# Patient Record
Sex: FEMALE | Race: WHITE | NOT HISPANIC OR LATINO
[De-identification: names, ages, dates, MRNs, and addresses within clinical notes are randomized per-mention and may not be internally consistent; named-entity substitution may affect disease eponyms.]

---

## 2017-10-30 ENCOUNTER — RX RENEWAL (OUTPATIENT)
Age: 27
End: 2017-10-30

## 2017-11-09 ENCOUNTER — APPOINTMENT (OUTPATIENT)
Dept: OBGYN | Facility: CLINIC | Age: 27
End: 2017-11-09
Payer: COMMERCIAL

## 2017-11-09 VITALS — DIASTOLIC BLOOD PRESSURE: 78 MMHG | WEIGHT: 137 LBS | SYSTOLIC BLOOD PRESSURE: 123 MMHG

## 2017-11-09 DIAGNOSIS — Z01.419 ENCOUNTER FOR GYNECOLOGICAL EXAMINATION (GENERAL) (ROUTINE) W/OUT ABNORMAL FINDINGS: ICD-10-CM

## 2017-11-09 PROCEDURE — 99395 PREV VISIT EST AGE 18-39: CPT

## 2017-11-13 LAB — CYTOLOGY CVX/VAG DOC THIN PREP: NORMAL

## 2017-11-21 ENCOUNTER — RX RENEWAL (OUTPATIENT)
Age: 27
End: 2017-11-21

## 2018-10-18 ENCOUNTER — MEDICATION RENEWAL (OUTPATIENT)
Age: 28
End: 2018-10-18

## 2018-11-12 ENCOUNTER — APPOINTMENT (OUTPATIENT)
Dept: OBGYN | Facility: CLINIC | Age: 28
End: 2018-11-12
Payer: COMMERCIAL

## 2018-11-12 VITALS
WEIGHT: 150 LBS | BODY MASS INDEX: 23.54 KG/M2 | DIASTOLIC BLOOD PRESSURE: 78 MMHG | SYSTOLIC BLOOD PRESSURE: 122 MMHG | HEIGHT: 67 IN

## 2018-11-12 PROCEDURE — 99395 PREV VISIT EST AGE 18-39: CPT

## 2018-11-19 LAB — CYTOLOGY CVX/VAG DOC THIN PREP: NORMAL

## 2019-08-22 ENCOUNTER — MEDICATION RENEWAL (OUTPATIENT)
Age: 29
End: 2019-08-22

## 2019-11-08 ENCOUNTER — RX RENEWAL (OUTPATIENT)
Age: 29
End: 2019-11-08

## 2019-11-25 ENCOUNTER — APPOINTMENT (OUTPATIENT)
Dept: OBGYN | Facility: CLINIC | Age: 29
End: 2019-11-25

## 2019-12-11 ENCOUNTER — APPOINTMENT (OUTPATIENT)
Dept: OBGYN | Facility: CLINIC | Age: 29
End: 2019-12-11
Payer: COMMERCIAL

## 2019-12-11 VITALS
SYSTOLIC BLOOD PRESSURE: 118 MMHG | WEIGHT: 140 LBS | DIASTOLIC BLOOD PRESSURE: 80 MMHG | HEIGHT: 67 IN | BODY MASS INDEX: 21.97 KG/M2

## 2019-12-11 DIAGNOSIS — Z01.419 ENCOUNTER FOR GYNECOLOGICAL EXAMINATION (GENERAL) (ROUTINE) W/OUT ABNORMAL FINDINGS: ICD-10-CM

## 2019-12-11 PROCEDURE — 99395 PREV VISIT EST AGE 18-39: CPT

## 2019-12-11 NOTE — PHYSICAL EXAM
[Alert] : alert [Awake] : awake [Mass] : no breast mass [Acute Distress] : no acute distress [Nipple Discharge] : no nipple discharge [Soft] : soft [Tender] : non tender [Axillary LAD] : no axillary lymphadenopathy [Oriented x3] : oriented to person, place, and time [Uterine Adnexae] : were not tender and not enlarged [Normal] : uterus [No Bleeding] : there was no active vaginal bleeding

## 2019-12-11 NOTE — HISTORY OF PRESENT ILLNESS
[1 Year Ago] : 1 year ago [Good] : being in good health [Healthy Diet] : a healthy diet [Regular Exercise] : regular exercise [Weight Concerns] : no concerns with her weight [Pap Smear Last Year] : Papanicolaou cytology last year [Menstrual Problems] : reports normal menses [Up to Date] : up to date with ~his/her~ immunizations [Sexually Active] : is sexually active

## 2019-12-16 LAB — CYTOLOGY CVX/VAG DOC THIN PREP: NORMAL

## 2021-02-11 ENCOUNTER — APPOINTMENT (OUTPATIENT)
Dept: OBGYN | Facility: CLINIC | Age: 31
End: 2021-02-11
Payer: COMMERCIAL

## 2021-02-11 VITALS
SYSTOLIC BLOOD PRESSURE: 117 MMHG | WEIGHT: 145 LBS | BODY MASS INDEX: 22.76 KG/M2 | DIASTOLIC BLOOD PRESSURE: 77 MMHG | HEIGHT: 67 IN

## 2021-02-11 PROCEDURE — 99395 PREV VISIT EST AGE 18-39: CPT

## 2021-02-11 PROCEDURE — 99072 ADDL SUPL MATRL&STAF TM PHE: CPT

## 2021-02-11 NOTE — HISTORY OF PRESENT ILLNESS
[FreeTextEntry1] : pt is a 29 y/o p0 lmp 2/8 presents for annual gyn visit  [No] : Patient does not have concerns regarding sex

## 2021-02-18 LAB — CYTOLOGY CVX/VAG DOC THIN PREP: NORMAL

## 2021-07-29 ENCOUNTER — TRANSCRIPTION ENCOUNTER (OUTPATIENT)
Age: 31
End: 2021-07-29

## 2021-07-29 ENCOUNTER — APPOINTMENT (OUTPATIENT)
Dept: OBGYN | Facility: CLINIC | Age: 31
End: 2021-07-29
Payer: COMMERCIAL

## 2021-07-29 ENCOUNTER — ASOB RESULT (OUTPATIENT)
Age: 31
End: 2021-07-29

## 2021-07-29 ENCOUNTER — NON-APPOINTMENT (OUTPATIENT)
Age: 31
End: 2021-07-29

## 2021-07-29 VITALS
SYSTOLIC BLOOD PRESSURE: 108 MMHG | HEIGHT: 67 IN | WEIGHT: 131 LBS | BODY MASS INDEX: 20.56 KG/M2 | DIASTOLIC BLOOD PRESSURE: 74 MMHG

## 2021-07-29 PROCEDURE — 76817 TRANSVAGINAL US OBSTETRIC: CPT

## 2021-07-29 PROCEDURE — 36415 COLL VENOUS BLD VENIPUNCTURE: CPT

## 2021-07-29 PROCEDURE — 99212 OFFICE O/P EST SF 10 MIN: CPT

## 2021-08-06 LAB
ABO + RH PNL BLD: NORMAL
ALBUMIN SERPL ELPH-MCNC: 4.8 G/DL
ALP BLD-CCNC: 50 U/L
ALT SERPL-CCNC: 12 U/L
ANION GAP SERPL CALC-SCNC: 15 MMOL/L
AR GENE MUT ANL BLD/T: NORMAL
AST SERPL-CCNC: 18 U/L
B19V IGG SER QL IA: 7.23 INDEX
B19V IGG+IGM SER-IMP: NORMAL
B19V IGG+IGM SER-IMP: POSITIVE
B19V IGM FLD-ACNC: 0.12 INDEX
B19V IGM SER-ACNC: NEGATIVE
BASOPHILS # BLD AUTO: 0.05 K/UL
BASOPHILS NFR BLD AUTO: 0.8 %
BILIRUB SERPL-MCNC: 0.3 MG/DL
BLD GP AB SCN SERPL QL: NORMAL
BUN SERPL-MCNC: 10 MG/DL
C TRACH RRNA SPEC QL NAA+PROBE: NOT DETECTED
CALCIUM SERPL-MCNC: 9.7 MG/DL
CHLORIDE SERPL-SCNC: 104 MMOL/L
CO2 SERPL-SCNC: 22 MMOL/L
CREAT SERPL-MCNC: 0.66 MG/DL
EOSINOPHIL # BLD AUTO: 0.08 K/UL
EOSINOPHIL NFR BLD AUTO: 1.3 %
FMR1 GENE MUT ANL BLD/T: NORMAL
GLUCOSE SERPL-MCNC: 94 MG/DL
HBV SURFACE AG SER QL: NONREACTIVE
HCT VFR BLD CALC: 40.6 %
HCV AB SER QL: NONREACTIVE
HCV S/CO RATIO: 0.08 S/CO
HGB A MFR BLD: 97.6 %
HGB A2 MFR BLD: 2.4 %
HGB BLD-MCNC: 13.5 G/DL
HGB FRACT BLD-IMP: NORMAL
HIV1+2 AB SPEC QL IA.RAPID: NONREACTIVE
IMM GRANULOCYTES NFR BLD AUTO: 0.2 %
LEAD BLD-MCNC: <1 UG/DL
LYMPHOCYTES # BLD AUTO: 1.76 K/UL
LYMPHOCYTES NFR BLD AUTO: 29.4 %
MAN DIFF?: NORMAL
MCHC RBC-ENTMCNC: 29.9 PG
MCHC RBC-ENTMCNC: 33.3 GM/DL
MCV RBC AUTO: 89.8 FL
MEV IGG FLD QL IA: >300 AU/ML
MEV IGG+IGM SER-IMP: POSITIVE
MEV IGM SER QL: <0.91 ISR
MONOCYTES # BLD AUTO: 0.44 K/UL
MONOCYTES NFR BLD AUTO: 7.3 %
N GONORRHOEA RRNA SPEC QL NAA+PROBE: NOT DETECTED
NEUTROPHILS # BLD AUTO: 3.65 K/UL
NEUTROPHILS NFR BLD AUTO: 61 %
PLATELET # BLD AUTO: 253 K/UL
POTASSIUM SERPL-SCNC: 3.8 MMOL/L
PROT SERPL-MCNC: 6.7 G/DL
RBC # BLD: 4.52 M/UL
RBC # FLD: 12.8 %
RUBV IGG FLD-ACNC: 23.3 INDEX
RUBV IGG SER-IMP: POSITIVE
SODIUM SERPL-SCNC: 141 MMOL/L
SOURCE AMPLIFICATION: NORMAL
T PALLIDUM AB SER QL IA: NEGATIVE
TSH SERPL-ACNC: 2.16 UIU/ML
VZV AB TITR SER: POSITIVE
VZV IGG SER IF-ACNC: 2225 INDEX
WBC # FLD AUTO: 5.99 K/UL

## 2021-08-10 ENCOUNTER — ASOB RESULT (OUTPATIENT)
Age: 31
End: 2021-08-10

## 2021-08-10 ENCOUNTER — NON-APPOINTMENT (OUTPATIENT)
Age: 31
End: 2021-08-10

## 2021-08-10 ENCOUNTER — APPOINTMENT (OUTPATIENT)
Dept: OBGYN | Facility: CLINIC | Age: 31
End: 2021-08-10
Payer: COMMERCIAL

## 2021-08-10 PROCEDURE — 76817 TRANSVAGINAL US OBSTETRIC: CPT

## 2021-08-12 ENCOUNTER — NON-APPOINTMENT (OUTPATIENT)
Age: 31
End: 2021-08-12

## 2021-08-15 ENCOUNTER — NON-APPOINTMENT (OUTPATIENT)
Age: 31
End: 2021-08-15

## 2021-08-15 LAB — CFTR MUT TESTED BLD/T: POSITIVE

## 2021-08-16 ENCOUNTER — NON-APPOINTMENT (OUTPATIENT)
Age: 31
End: 2021-08-16

## 2021-08-18 ENCOUNTER — ASOB RESULT (OUTPATIENT)
Age: 31
End: 2021-08-18

## 2021-08-18 ENCOUNTER — APPOINTMENT (OUTPATIENT)
Dept: OBGYN | Facility: CLINIC | Age: 31
End: 2021-08-18
Payer: COMMERCIAL

## 2021-08-18 PROCEDURE — 76830 TRANSVAGINAL US NON-OB: CPT

## 2021-08-20 ENCOUNTER — NON-APPOINTMENT (OUTPATIENT)
Age: 31
End: 2021-08-20

## 2021-08-26 ENCOUNTER — NON-APPOINTMENT (OUTPATIENT)
Age: 31
End: 2021-08-26

## 2021-08-26 ENCOUNTER — TRANSCRIPTION ENCOUNTER (OUTPATIENT)
Age: 31
End: 2021-08-26

## 2021-08-26 ENCOUNTER — APPOINTMENT (OUTPATIENT)
Dept: OBGYN | Facility: CLINIC | Age: 31
End: 2021-08-26
Payer: COMMERCIAL

## 2021-08-26 ENCOUNTER — ASOB RESULT (OUTPATIENT)
Age: 31
End: 2021-08-26

## 2021-08-26 PROCEDURE — 76830 TRANSVAGINAL US NON-OB: CPT

## 2021-08-27 ENCOUNTER — NON-APPOINTMENT (OUTPATIENT)
Age: 31
End: 2021-08-27

## 2021-09-01 ENCOUNTER — NON-APPOINTMENT (OUTPATIENT)
Age: 31
End: 2021-09-01

## 2021-09-02 ENCOUNTER — APPOINTMENT (OUTPATIENT)
Dept: PEDIATRIC MEDICAL GENETICS | Facility: CLINIC | Age: 31
End: 2021-09-02

## 2021-09-09 ENCOUNTER — APPOINTMENT (OUTPATIENT)
Dept: OBGYN | Facility: CLINIC | Age: 31
End: 2021-09-09

## 2021-09-09 ENCOUNTER — ASOB RESULT (OUTPATIENT)
Age: 31
End: 2021-09-09

## 2021-09-09 ENCOUNTER — APPOINTMENT (OUTPATIENT)
Dept: OBGYN | Facility: CLINIC | Age: 31
End: 2021-09-09
Payer: COMMERCIAL

## 2021-09-09 ENCOUNTER — APPOINTMENT (OUTPATIENT)
Dept: ANTEPARTUM | Facility: CLINIC | Age: 31
End: 2021-09-09

## 2021-09-09 PROCEDURE — 76830 TRANSVAGINAL US NON-OB: CPT

## 2021-09-10 ENCOUNTER — NON-APPOINTMENT (OUTPATIENT)
Age: 31
End: 2021-09-10

## 2021-09-22 ENCOUNTER — APPOINTMENT (OUTPATIENT)
Dept: MATERNAL FETAL MEDICINE | Facility: CLINIC | Age: 31
End: 2021-09-22

## 2021-10-01 ENCOUNTER — NON-APPOINTMENT (OUTPATIENT)
Age: 31
End: 2021-10-01

## 2021-11-11 ENCOUNTER — FORM ENCOUNTER (OUTPATIENT)
Age: 31
End: 2021-11-11

## 2021-12-03 ENCOUNTER — APPOINTMENT (OUTPATIENT)
Dept: OBGYN | Facility: CLINIC | Age: 31
End: 2021-12-03

## 2021-12-08 ENCOUNTER — APPOINTMENT (OUTPATIENT)
Dept: OBGYN | Facility: CLINIC | Age: 31
End: 2021-12-08

## 2022-01-03 ENCOUNTER — APPOINTMENT (OUTPATIENT)
Dept: ANTEPARTUM | Facility: CLINIC | Age: 32
End: 2022-01-03
Payer: COMMERCIAL

## 2022-01-03 ENCOUNTER — ASOB RESULT (OUTPATIENT)
Age: 32
End: 2022-01-03

## 2022-01-03 PROCEDURE — 76813 OB US NUCHAL MEAS 1 GEST: CPT

## 2022-01-03 PROCEDURE — 36416 COLLJ CAPILLARY BLOOD SPEC: CPT

## 2022-01-03 PROCEDURE — 76801 OB US < 14 WKS SINGLE FETUS: CPT

## 2022-02-25 ENCOUNTER — ASOB RESULT (OUTPATIENT)
Age: 32
End: 2022-02-25

## 2022-02-25 ENCOUNTER — APPOINTMENT (OUTPATIENT)
Dept: ANTEPARTUM | Facility: CLINIC | Age: 32
End: 2022-02-25
Payer: COMMERCIAL

## 2022-02-25 PROCEDURE — 76805 OB US >/= 14 WKS SNGL FETUS: CPT

## 2022-03-07 ENCOUNTER — APPOINTMENT (OUTPATIENT)
Dept: ANTEPARTUM | Facility: CLINIC | Age: 32
End: 2022-03-07
Payer: COMMERCIAL

## 2022-03-07 ENCOUNTER — ASOB RESULT (OUTPATIENT)
Age: 32
End: 2022-03-07

## 2022-03-07 PROCEDURE — 76816 OB US FOLLOW-UP PER FETUS: CPT

## 2022-05-30 ENCOUNTER — INPATIENT (INPATIENT)
Facility: HOSPITAL | Age: 32
LOS: 0 days | Discharge: HOME | End: 2022-05-31
Attending: STUDENT IN AN ORGANIZED HEALTH CARE EDUCATION/TRAINING PROGRAM | Admitting: STUDENT IN AN ORGANIZED HEALTH CARE EDUCATION/TRAINING PROGRAM

## 2022-05-30 VITALS — HEART RATE: 69 BPM | DIASTOLIC BLOOD PRESSURE: 86 MMHG | TEMPERATURE: 98 F | SYSTOLIC BLOOD PRESSURE: 119 MMHG

## 2022-05-30 DIAGNOSIS — Z98.890 OTHER SPECIFIED POSTPROCEDURAL STATES: Chronic | ICD-10-CM

## 2022-05-30 DIAGNOSIS — Z90.89 ACQUIRED ABSENCE OF OTHER ORGANS: Chronic | ICD-10-CM

## 2022-05-30 DIAGNOSIS — O26.899 OTHER SPECIFIED PREGNANCY RELATED CONDITIONS, UNSPECIFIED TRIMESTER: ICD-10-CM

## 2022-05-30 DIAGNOSIS — Z3A.00 WEEKS OF GESTATION OF PREGNANCY NOT SPECIFIED: ICD-10-CM

## 2022-05-30 LAB
ALBUMIN SERPL ELPH-MCNC: 3.5 G/DL — SIGNIFICANT CHANGE UP (ref 3.3–5)
ALP SERPL-CCNC: 96 U/L — SIGNIFICANT CHANGE UP (ref 40–120)
ALT FLD-CCNC: 10 U/L — SIGNIFICANT CHANGE UP (ref 4–33)
ANION GAP SERPL CALC-SCNC: 11 MMOL/L — SIGNIFICANT CHANGE UP (ref 7–14)
APPEARANCE UR: CLEAR — SIGNIFICANT CHANGE UP
APTT BLD: 27 SEC — SIGNIFICANT CHANGE UP (ref 27–36.3)
AST SERPL-CCNC: 19 U/L — SIGNIFICANT CHANGE UP (ref 4–32)
BASOPHILS # BLD AUTO: 0.05 K/UL — SIGNIFICANT CHANGE UP (ref 0–0.2)
BASOPHILS NFR BLD AUTO: 0.5 % — SIGNIFICANT CHANGE UP (ref 0–2)
BILIRUB SERPL-MCNC: 0.3 MG/DL — SIGNIFICANT CHANGE UP (ref 0.2–1.2)
BILIRUB UR-MCNC: NEGATIVE — SIGNIFICANT CHANGE UP
BLD GP AB SCN SERPL QL: NEGATIVE — SIGNIFICANT CHANGE UP
BUN SERPL-MCNC: 4 MG/DL — LOW (ref 7–23)
CALCIUM SERPL-MCNC: 9 MG/DL — SIGNIFICANT CHANGE UP (ref 8.4–10.5)
CHLORIDE SERPL-SCNC: 107 MMOL/L — SIGNIFICANT CHANGE UP (ref 98–107)
CO2 SERPL-SCNC: 19 MMOL/L — LOW (ref 22–31)
COLOR SPEC: COLORLESS — SIGNIFICANT CHANGE UP
COVID-19 SPIKE DOMAIN AB INTERP: POSITIVE
COVID-19 SPIKE DOMAIN ANTIBODY RESULT: >250 U/ML — HIGH
CREAT ?TM UR-MCNC: 17 MG/DL — SIGNIFICANT CHANGE UP
CREAT SERPL-MCNC: 0.49 MG/DL — LOW (ref 0.5–1.3)
DIFF PNL FLD: NEGATIVE — SIGNIFICANT CHANGE UP
EGFR: 131 ML/MIN/1.73M2 — SIGNIFICANT CHANGE UP
EOSINOPHIL # BLD AUTO: 0.03 K/UL — SIGNIFICANT CHANGE UP (ref 0–0.5)
EOSINOPHIL NFR BLD AUTO: 0.3 % — SIGNIFICANT CHANGE UP (ref 0–6)
FIBRINOGEN PPP-MCNC: 674 MG/DL — HIGH (ref 330–520)
GLUCOSE SERPL-MCNC: 74 MG/DL — SIGNIFICANT CHANGE UP (ref 70–99)
GLUCOSE UR QL: NEGATIVE — SIGNIFICANT CHANGE UP
HCT VFR BLD CALC: 34.6 % — SIGNIFICANT CHANGE UP (ref 34.5–45)
HGB BLD-MCNC: 11.2 G/DL — LOW (ref 11.5–15.5)
IANC: 6.99 K/UL — SIGNIFICANT CHANGE UP (ref 1.8–7.4)
IMM GRANULOCYTES NFR BLD AUTO: 0.5 % — SIGNIFICANT CHANGE UP (ref 0–1.5)
KETONES UR-MCNC: NEGATIVE — SIGNIFICANT CHANGE UP
LEUKOCYTE ESTERASE UR-ACNC: NEGATIVE — SIGNIFICANT CHANGE UP
LYMPHOCYTES # BLD AUTO: 1.67 K/UL — SIGNIFICANT CHANGE UP (ref 1–3.3)
LYMPHOCYTES # BLD AUTO: 17.8 % — SIGNIFICANT CHANGE UP (ref 13–44)
MCHC RBC-ENTMCNC: 26.9 PG — LOW (ref 27–34)
MCHC RBC-ENTMCNC: 32.4 GM/DL — SIGNIFICANT CHANGE UP (ref 32–36)
MCV RBC AUTO: 83.2 FL — SIGNIFICANT CHANGE UP (ref 80–100)
MONOCYTES # BLD AUTO: 0.59 K/UL — SIGNIFICANT CHANGE UP (ref 0–0.9)
MONOCYTES NFR BLD AUTO: 6.3 % — SIGNIFICANT CHANGE UP (ref 2–14)
NEUTROPHILS # BLD AUTO: 6.99 K/UL — SIGNIFICANT CHANGE UP (ref 1.8–7.4)
NEUTROPHILS NFR BLD AUTO: 74.6 % — SIGNIFICANT CHANGE UP (ref 43–77)
NITRITE UR-MCNC: NEGATIVE — SIGNIFICANT CHANGE UP
NRBC # BLD: 0 /100 WBCS — SIGNIFICANT CHANGE UP
NRBC # FLD: 0 K/UL — SIGNIFICANT CHANGE UP
PH UR: 8 — SIGNIFICANT CHANGE UP (ref 5–8)
PLATELET # BLD AUTO: 157 K/UL — SIGNIFICANT CHANGE UP (ref 150–400)
POTASSIUM SERPL-MCNC: 4 MMOL/L — SIGNIFICANT CHANGE UP (ref 3.5–5.3)
POTASSIUM SERPL-SCNC: 4 MMOL/L — SIGNIFICANT CHANGE UP (ref 3.5–5.3)
PROT ?TM UR-MCNC: 4 MG/DL — SIGNIFICANT CHANGE UP
PROT ?TM UR-MCNC: 4 MG/DL — SIGNIFICANT CHANGE UP
PROT SERPL-MCNC: 5.7 G/DL — LOW (ref 6–8.3)
PROT UR-MCNC: NEGATIVE — SIGNIFICANT CHANGE UP
PROT/CREAT UR-RTO: 0.2 RATIO — SIGNIFICANT CHANGE UP (ref 0–0.2)
RBC # BLD: 4.16 M/UL — SIGNIFICANT CHANGE UP (ref 3.8–5.2)
RBC # FLD: 13.1 % — SIGNIFICANT CHANGE UP (ref 10.3–14.5)
RH IG SCN BLD-IMP: POSITIVE — SIGNIFICANT CHANGE UP
RH IG SCN BLD-IMP: POSITIVE — SIGNIFICANT CHANGE UP
SARS-COV-2 IGG+IGM SERPL QL IA: >250 U/ML — HIGH
SARS-COV-2 IGG+IGM SERPL QL IA: POSITIVE
SARS-COV-2 RNA SPEC QL NAA+PROBE: SIGNIFICANT CHANGE UP
SODIUM SERPL-SCNC: 137 MMOL/L — SIGNIFICANT CHANGE UP (ref 135–145)
SP GR SPEC: 1.01 — SIGNIFICANT CHANGE UP (ref 1–1.05)
T PALLIDUM AB TITR SER: NEGATIVE — SIGNIFICANT CHANGE UP
URATE SERPL-MCNC: 3.9 MG/DL — SIGNIFICANT CHANGE UP (ref 2.5–7)
UROBILINOGEN FLD QL: SIGNIFICANT CHANGE UP
WBC # BLD: 9.38 K/UL — SIGNIFICANT CHANGE UP (ref 3.8–10.5)
WBC # FLD AUTO: 9.38 K/UL — SIGNIFICANT CHANGE UP (ref 3.8–10.5)

## 2022-05-30 RX ORDER — SODIUM CHLORIDE 9 MG/ML
1000 INJECTION, SOLUTION INTRAVENOUS
Refills: 0 | Status: DISCONTINUED | OUTPATIENT
Start: 2022-05-30 | End: 2022-05-31

## 2022-05-30 RX ORDER — SODIUM CHLORIDE 9 MG/ML
1000 INJECTION, SOLUTION INTRAVENOUS ONCE
Refills: 0 | Status: COMPLETED | OUTPATIENT
Start: 2022-05-30 | End: 2022-05-30

## 2022-05-30 RX ADMIN — SODIUM CHLORIDE 2000 MILLILITER(S): 9 INJECTION, SOLUTION INTRAVENOUS at 12:40

## 2022-05-30 RX ADMIN — SODIUM CHLORIDE 125 MILLILITER(S): 9 INJECTION, SOLUTION INTRAVENOUS at 13:55

## 2022-05-30 RX ADMIN — Medication 12 MILLIGRAM(S): at 15:48

## 2022-05-30 NOTE — OB PROVIDER H&P - NSHPPHYSICALEXAM_GEN_ALL_CORE
pt seen and examined     in [facility]   ICU Vital Signs Last 24 Hrs  T(C): 36.6 (30 May 2022 11:27), Max: 36.6 (30 May 2022 11:16)  T(F): 97.9 (30 May 2022 11:27), Max: 97.9 (30 May 2022 11:27)  HR: 71 (30 May 2022 12:02) (68 - 71)  BP: 117/88 (30 May 2022 12:02) (109/74 - 119/86)  RR: 17 (30 May 2022 11:27) (17 - 17)  pt in NAD   lungs clear   heart s1 s2  abd soft gravid palpable contrxs   placed on EFM      scan vertex cristin 9 bpp 8/8    sees FM during scan pt seen and examined     in [facility]   ICU Vital Signs Last 24 Hrs  T(C): 36.6 (30 May 2022 11:27), Max: 36.6 (30 May 2022 11:16)  T(F): 97.9 (30 May 2022 11:27), Max: 97.9 (30 May 2022 11:27)  HR: 71 (30 May 2022 12:02) (68 - 71)  BP: 117/88 (30 May 2022 12:02) (109/74 - 119/86)  RR: 17 (30 May 2022 11:27) (17 - 17)  pt in NAD   lungs clear   heart s1 s2  abd soft gravid palpable contrxs   placed on EFM      scan vertex cristin 9 bpp 8/8    sees FM during scan  12p VE l/c/p/-3 vtx   2p VE l/c/p/-3  vtx  exam unchanged

## 2022-05-30 NOTE — OB RN TRIAGE NOTE - FALL HARM RISK - UNIVERSAL INTERVENTIONS
Bed in lowest position, wheels locked, appropriate side rails in place/Call bell, personal items and telephone in reach/Instruct patient to call for assistance before getting out of bed or chair/Non-slip footwear when patient is out of bed/[place] to call system/Physically safe environment - no spills, clutter or unnecessary equipment/Purposeful Proactive Rounding/Room/bathroom lighting operational, light cord in reach

## 2022-05-30 NOTE — OB PROVIDER TRIAGE NOTE - HISTORY OF PRESENT ILLNESS
31 year old female  at 33.1 weeks who stated that she has been feeling decreased FM since Saturday and stated felt no FM this am    despite drinking eating and moving around and stated fetus usually active  stated that last scan breech presentation    denied any ap issues denied any fetal issues    stated feels irregular contractions at times  denied any LOF VB

## 2022-05-30 NOTE — OB RN PATIENT PROFILE - FALL HARM RISK - UNIVERSAL INTERVENTIONS
Bed in lowest position, wheels locked, appropriate side rails in place/Call bell, personal items and telephone in reach/Instruct patient to call for assistance before getting out of bed or chair/Non-slip footwear when patient is out of bed/[place] to call system/Physically safe environment - no spills, clutter or unnecessary equipment/Purposeful Proactive Rounding/Room/bathroom lighting operational, light cord in reach

## 2022-05-30 NOTE — OB PROVIDER H&P - HISTORY OF PRESENT ILLNESS
31 year old female  at 33.1 weeks who stated that she has been feeling decreased FM since Saturday and stated felt no FM this am    despite drinking eating and moving around and stated fetus usually active  stated that last scan breech presentation    denied any ap issues denied any fetal issues    stated feels irregular contractions at times  denied any LOF VB       31 year old female  at 33.1 weeks who stated that she has been feeling decreased FM since Saturday and stated felt no FM this am    despite drinking eating and moving around and stated fetus usually active  stated that last scan breech presentation    denied any ap issues denied any fetal issues    stated feels irregular contractions at times  denied any LOF VB    stated that she had a office visit with labile BP and trace protein and was following BP at home stated normal BP range   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

## 2022-05-30 NOTE — CHART NOTE - NSCHARTNOTEFT_GEN_A_CORE
R3 OB Note    Patient evaluated at bedside. Patient reports she presented to L&D for decreased fetal movement x 1 day. Reports feeling "raul greco" for a few days. Reports feeling nonpainful tightening every few minutes.    VS  T(C): 36.8 (22 @ 15:41)  HR: 83 (22 @ 19:14)  BP: 130/87 (22 @ 19:14)  RR: 17 (22 @ 15:41)  SpO2: --    Gen: NAD  Resp: unlabored on RA  CV: RR  GI: soft, nontender, gravid    FHT: 135, mod, + accels. - decels  [place]: q3-4 min  SVE: closed/long/-2      32yo  at 33 1/7 initially presenting for decreased fetal movement, found to have regular contractions without cervical change. No signs or symptoms of infection or PTL at this time. Fetal status reassuring    # contractions  - BMZ for FLM (-)  - pt s/p prolonged monitoring; switch to NST BID  - no signs or symptoms of infection/PTL  - UA wnl      #MWB  - Reg Diet, SLIV  - SCDs for DVT PPx  - PNV    #FWB  - BMZ/monitoring as above  - NICU consult PRN  - MICHELLE youssef in AM    TAMMY Myers, PGY-3  d/w Dr. Laird

## 2022-05-30 NOTE — OB PROVIDER H&P - ASSESSMENT
31 year old female  at 33.1 weeks initially presented with decreased FM  noted with  contractions    kept for observation    Betamethasone for FLM    NICU consult  prn    ATU scan in AM     case d/w Dr Youssef  in to see and speak with patient and plan of care d/w patient     Huber SMITH   31 year old female  at 33.1 weeks initially presented with decreased FM  noted with  contractions    kept for observation    Betamethasone for FLM    NICU consult  prn    ATU scan in AM    baseline Hellp labs  ( hx of isolated labile BP at office visit     case d/w Dr Youssef  in to see and speak with patient and plan of care d/w patient     Huber SMITH   31 year old female  at 33.1 weeks initially presented with decreased FM  noted with  contractions  no cervical change   kept for observation and monitoring.   Betamethasone for FLM    NICU consult  prn    ATU scan in AM    baseline Hellp labs  ( hx of isolated labile BP at office visit     case d/w Dr Youssef  in to see and speak with patient and plan of care d/w patient     Huber SMITH

## 2022-05-31 ENCOUNTER — APPOINTMENT (OUTPATIENT)
Dept: ANTEPARTUM | Facility: HOSPITAL | Age: 32
End: 2022-05-31
Payer: COMMERCIAL

## 2022-05-31 ENCOUNTER — TRANSCRIPTION ENCOUNTER (OUTPATIENT)
Age: 32
End: 2022-05-31

## 2022-05-31 ENCOUNTER — ASOB RESULT (OUTPATIENT)
Age: 32
End: 2022-05-31

## 2022-05-31 VITALS
HEART RATE: 80 BPM | DIASTOLIC BLOOD PRESSURE: 82 MMHG | TEMPERATURE: 98 F | RESPIRATION RATE: 17 BRPM | SYSTOLIC BLOOD PRESSURE: 135 MMHG | OXYGEN SATURATION: 98 %

## 2022-05-31 LAB
CULTURE RESULTS: SIGNIFICANT CHANGE UP
CULTURE RESULTS: SIGNIFICANT CHANGE UP
SPECIMEN SOURCE: SIGNIFICANT CHANGE UP
SPECIMEN SOURCE: SIGNIFICANT CHANGE UP

## 2022-05-31 PROCEDURE — 76816 OB US FOLLOW-UP PER FETUS: CPT | Mod: 26

## 2022-05-31 PROCEDURE — 76819 FETAL BIOPHYS PROFIL W/O NST: CPT | Mod: 26

## 2022-05-31 RX ORDER — CHOLECALCIFEROL (VITAMIN D3) 125 MCG
0 CAPSULE ORAL
Qty: 0 | Refills: 0 | DISCHARGE

## 2022-05-31 RX ADMIN — Medication 12 MILLIGRAM(S): at 15:54

## 2022-05-31 NOTE — DISCHARGE NOTE ANTEPARTUM - CARE PROVIDER_API CALL
Ronny Kinsey)  Obstetrics and Gynecology  [address] [place] [address]  Phone: [phone]  Fax: [phone]  Follow Up Time:

## 2022-05-31 NOTE — PROGRESS NOTE ADULT - ATTENDING COMMENTS
Patient seen and examined at bedside. Agree with above note. For likely dc today if exam unchanged after ATU sonogram and 2nd dose of betamethasone.

## 2022-05-31 NOTE — DISCHARGE NOTE ANTEPARTUM - MEDICATION SUMMARY - MEDICATIONS TO TAKE
I will START or STAY ON the medications listed below when I get home from the hospital:    PNV Prenatal oral tablet  -- 1 tab(s) by mouth once a day  -- Indication: For maternal health

## 2022-05-31 NOTE — DISCHARGE NOTE ANTEPARTUM - CARE PLAN
1 Principal Discharge DX:	Decreased fetal movement, antepartum  Assessment and plan of treatment:	- call OBGYN to schedule follow up, please plan to be seen within one week or sooner based on your OBGYN recommendations  - please return to hospital for further evaluation for decreased fetal movement, no fetal movement, leaking of fluid, vaginal bleeding, cramping and/or contractions  -please do not hesitate to contact for questions and/or concerns

## 2022-05-31 NOTE — CHART NOTE - NSCHARTNOTEFT_GEN_A_CORE
Patient completed prolong continuos monitoring.  reviewed fetal heart tracing. Patient cleared for intermittent monitoring at this time. Patient to resume fetal heart tracing at 1730.

## 2022-05-31 NOTE — PROGRESS NOTE ADULT - ASSESSMENT
30yo  at 33+2 initially presenting for decreased fetal movement, found to have regular contractions without cervical change. No signs or symptoms of infection or PTL at this time. Fetal status reassuring    # contractions  - BMZ for FLM (-)  - NST BID  - no signs or symptoms of infection/PTL  - UA wnl    #MWB  - Reg Diet, SLIV  - SCDs for DVT PPx  - PNV    #FWB  - BMZ/monitoring as above  - NICU consult PRN  - ATU domonique in AM    Eval for discharge today  Tu PGY3

## 2022-05-31 NOTE — DISCHARGE NOTE ANTEPARTUM - PATIENT PORTAL LINK FT
You can access the FollowMyHealth Patient Portal offered by [facility] by registering at the following website: [email or web address]. By joining [employer]’s FollowMyHealth portal, you will also be able to view your health information using other applications (apps) compatible with our system.

## 2022-05-31 NOTE — DISCHARGE NOTE ANTEPARTUM - HOSPITAL COURSE
Patient is a 21 y/o EGA 33 2/  admitted for decreased fetal movement x 1 day. In addition, patient reports feeling "raul greco" for a few days. Reports feeling nonpainful tightening every few minutes.    Assessment/Plan during hospital stay:    FHT: 135, mod, + accels. - decels  [place]: q3-4 min  SVE: closed/long/-     contractions  - BMZ for FLM (-)  - pt s/p prolonged monitoring; switch to non stress test monitoring  - no signs or symptoms of infection/PTL  - UA wnl  - ATU sono 2022 results:    MFM discharge recommendations: Patient is a 23 y/o EGA 33 2/  admitted for decreased fetal movement x 1 day. In addition, patient reports feeling "raul greco" for a few days. Reports feeling nonpainful tightening every few minutes.    Assessment/Plan during hospital stay:    FHT: 135, mod, + accels. - decels  [place]: q3-4 min  SVE: closed/long/-  Exam prior to discharge: 0/40/-3     contractions  - BMZ for FLM (-)  - pt s/p prolonged monitoring; switch to non stress test monitoring  - no signs or symptoms of infection/PTL  - UA wnl  - ATU sono 2022 results: cephalic presentation, anterior placenta, no previa, AFUA 12.35, EFW 1548,  BPP    MFM discharge recommendations: Patient to follow up with OBGYN. Patient is scheduled to see OBGYN tomorrow evening. Patient verbalized plan of care.

## 2022-05-31 NOTE — PROGRESS NOTE ADULT - SUBJECTIVE AND OBJECTIVE BOX
R3 Antepartum Note, HD#2    Gestational AGE: 33+2    Interval events: Patient seen and examined at bedside, no acute overnight events. No acute complaints. Pt reports +FM, denies LOF, VB, ctx, HA, epigastric pain, blurred vision, CP, SOB, N/V, fevers, and chills.    Vital Signs Last 24 Hours  T(C): 36.7 (05-31-22 @ 05:40), Max: 36.8 (05-30-22 @ 15:07)  HR: 72 (05-31-22 @ 05:40) (64 - 83)  BP: 108/61 (05-31-22 @ 05:40) (99/54 - 153/88)  RR: 18 (05-31-22 @ 05:40) (17 - 20)  SpO2: 98% (05-31-22 @ 05:40) (97% - 99%)        Physical Exam:  General: NAD  Abdomen: Soft, non-tender, gravid  Ext: No pain or swelling    NST reactive overnight    Labs:             11.2   9.38  )-----------( 157      ( 05-30 @ 15:18 )             34.6     05-30 @ 15:18    137  |  107  |  4   ----------------------------<  74  4.0   |  19  |  0.49    Ca    9.0      05-30 @ 15:18    TPro  5.7  /  Alb  3.5  /  TBili  0.3  /  DBili  x   /  AST  19  /  ALT  10  /  AlkPhos  96  05-30 @ 15:18      PTT - ( 05-30 @ 15:18 )  PTT:27.0 sec    Uric Acid: (05-30 @ 15:18)  3.9      Fibrinogen: (05-30 @ 15:18)  674      LDH: (05-30 @ 15:18)  --         MEDICATIONS  (STANDING):  lactated ringers. 1000 milliLiter(s) (125 mL/Hr) IV Continuous <Continuous>    MEDICATIONS  (PRN):

## 2022-06-01 DIAGNOSIS — O60.00 PRETERM LABOR WITHOUT DELIVERY, UNSPECIFIED TRIMESTER: ICD-10-CM

## 2022-06-26 ENCOUNTER — TRANSCRIPTION ENCOUNTER (OUTPATIENT)
Age: 32
End: 2022-06-26

## 2022-06-26 ENCOUNTER — INPATIENT (INPATIENT)
Facility: HOSPITAL | Age: 32
LOS: 2 days | Discharge: HOME | End: 2022-06-29
Attending: OBSTETRICS & GYNECOLOGY | Admitting: OBSTETRICS & GYNECOLOGY
Payer: COMMERCIAL

## 2022-06-26 VITALS
SYSTOLIC BLOOD PRESSURE: 130 MMHG | RESPIRATION RATE: 16 BRPM | HEART RATE: 78 BPM | TEMPERATURE: 98 F | DIASTOLIC BLOOD PRESSURE: 86 MMHG

## 2022-06-26 DIAGNOSIS — Z90.89 ACQUIRED ABSENCE OF OTHER ORGANS: Chronic | ICD-10-CM

## 2022-06-26 DIAGNOSIS — O13.3 GESTATIONAL [PREGNANCY-INDUCED] HYPERTENSION WITHOUT SIGNIFICANT PROTEINURIA, THIRD TRIMESTER: ICD-10-CM

## 2022-06-26 DIAGNOSIS — Z98.890 OTHER SPECIFIED POSTPROCEDURAL STATES: Chronic | ICD-10-CM

## 2022-06-26 RX ORDER — CITRIC ACID/SODIUM CITRATE 300-500 MG
15 SOLUTION, ORAL ORAL EVERY 6 HOURS
Refills: 0 | Status: DISCONTINUED | OUTPATIENT
Start: 2022-06-26 | End: 2022-06-27

## 2022-06-26 RX ORDER — SODIUM CHLORIDE 9 MG/ML
1000 INJECTION, SOLUTION INTRAVENOUS
Refills: 0 | Status: DISCONTINUED | OUTPATIENT
Start: 2022-06-26 | End: 2022-06-27

## 2022-06-26 RX ORDER — OXYTOCIN 10 UNIT/ML
333.33 VIAL (ML) INJECTION
Qty: 20 | Refills: 0 | Status: DISCONTINUED | OUTPATIENT
Start: 2022-06-26 | End: 2022-06-28

## 2022-06-27 ENCOUNTER — TRANSCRIPTION ENCOUNTER (OUTPATIENT)
Age: 32
End: 2022-06-27

## 2022-06-27 LAB
ALBUMIN SERPL ELPH-MCNC: 3.4 G/DL — SIGNIFICANT CHANGE UP (ref 3.3–5)
ALP SERPL-CCNC: 115 U/L — SIGNIFICANT CHANGE UP (ref 40–120)
ALT FLD-CCNC: 9 U/L — SIGNIFICANT CHANGE UP (ref 4–33)
ANION GAP SERPL CALC-SCNC: 15 MMOL/L — HIGH (ref 7–14)
APPEARANCE UR: CLEAR — SIGNIFICANT CHANGE UP
APTT BLD: 26.3 SEC — LOW (ref 27–36.3)
AST SERPL-CCNC: 20 U/L — SIGNIFICANT CHANGE UP (ref 4–32)
BACTERIA # UR AUTO: ABNORMAL
BASOPHILS # BLD AUTO: 0.02 K/UL — SIGNIFICANT CHANGE UP (ref 0–0.2)
BASOPHILS NFR BLD AUTO: 0.2 % — SIGNIFICANT CHANGE UP (ref 0–2)
BILIRUB SERPL-MCNC: 0.2 MG/DL — SIGNIFICANT CHANGE UP (ref 0.2–1.2)
BILIRUB UR-MCNC: NEGATIVE — SIGNIFICANT CHANGE UP
BLD GP AB SCN SERPL QL: NEGATIVE — SIGNIFICANT CHANGE UP
BUN SERPL-MCNC: 6 MG/DL — LOW (ref 7–23)
CALCIUM SERPL-MCNC: 9.1 MG/DL — SIGNIFICANT CHANGE UP (ref 8.4–10.5)
CHLORIDE SERPL-SCNC: 101 MMOL/L — SIGNIFICANT CHANGE UP (ref 98–107)
CO2 SERPL-SCNC: 18 MMOL/L — LOW (ref 22–31)
COLOR SPEC: SIGNIFICANT CHANGE UP
COVID-19 SPIKE DOMAIN AB INTERP: POSITIVE
COVID-19 SPIKE DOMAIN ANTIBODY RESULT: >250 U/ML — HIGH
CREAT ?TM UR-MCNC: 38 MG/DL — SIGNIFICANT CHANGE UP
CREAT SERPL-MCNC: 0.51 MG/DL — SIGNIFICANT CHANGE UP (ref 0.5–1.3)
DIFF PNL FLD: NEGATIVE — SIGNIFICANT CHANGE UP
EGFR: 130 ML/MIN/1.73M2 — SIGNIFICANT CHANGE UP
EOSINOPHIL # BLD AUTO: 0.03 K/UL — SIGNIFICANT CHANGE UP (ref 0–0.5)
EOSINOPHIL NFR BLD AUTO: 0.4 % — SIGNIFICANT CHANGE UP (ref 0–6)
EPI CELLS # UR: 6 /HPF — HIGH (ref 0–5)
FIBRINOGEN PPP-MCNC: 654 MG/DL — HIGH (ref 330–520)
GLUCOSE SERPL-MCNC: 104 MG/DL — HIGH (ref 70–99)
GLUCOSE UR QL: NEGATIVE — SIGNIFICANT CHANGE UP
HCT VFR BLD CALC: 31.6 % — LOW (ref 34.5–45)
HGB BLD-MCNC: 10.1 G/DL — LOW (ref 11.5–15.5)
HYALINE CASTS # UR AUTO: 2 /LPF — SIGNIFICANT CHANGE UP (ref 0–7)
IANC: 6 K/UL — SIGNIFICANT CHANGE UP (ref 1.8–7.4)
IMM GRANULOCYTES NFR BLD AUTO: 0.5 % — SIGNIFICANT CHANGE UP (ref 0–1.5)
INR BLD: 0.97 RATIO — SIGNIFICANT CHANGE UP (ref 0.88–1.16)
KETONES UR-MCNC: NEGATIVE — SIGNIFICANT CHANGE UP
LDH SERPL L TO P-CCNC: 254 U/L — HIGH (ref 135–225)
LEUKOCYTE ESTERASE UR-ACNC: ABNORMAL
LYMPHOCYTES # BLD AUTO: 1.64 K/UL — SIGNIFICANT CHANGE UP (ref 1–3.3)
LYMPHOCYTES # BLD AUTO: 19.5 % — SIGNIFICANT CHANGE UP (ref 13–44)
MCHC RBC-ENTMCNC: 25.7 PG — LOW (ref 27–34)
MCHC RBC-ENTMCNC: 32 GM/DL — SIGNIFICANT CHANGE UP (ref 32–36)
MCV RBC AUTO: 80.4 FL — SIGNIFICANT CHANGE UP (ref 80–100)
MONOCYTES # BLD AUTO: 0.66 K/UL — SIGNIFICANT CHANGE UP (ref 0–0.9)
MONOCYTES NFR BLD AUTO: 7.9 % — SIGNIFICANT CHANGE UP (ref 2–14)
NEUTROPHILS # BLD AUTO: 6 K/UL — SIGNIFICANT CHANGE UP (ref 1.8–7.4)
NEUTROPHILS NFR BLD AUTO: 71.5 % — SIGNIFICANT CHANGE UP (ref 43–77)
NITRITE UR-MCNC: NEGATIVE — SIGNIFICANT CHANGE UP
NRBC # BLD: 0 /100 WBCS — SIGNIFICANT CHANGE UP
NRBC # FLD: 0 K/UL — SIGNIFICANT CHANGE UP
PH UR: 6 — SIGNIFICANT CHANGE UP (ref 5–8)
PLATELET # BLD AUTO: 161 K/UL — SIGNIFICANT CHANGE UP (ref 150–400)
POTASSIUM SERPL-MCNC: 3.7 MMOL/L — SIGNIFICANT CHANGE UP (ref 3.5–5.3)
POTASSIUM SERPL-SCNC: 3.7 MMOL/L — SIGNIFICANT CHANGE UP (ref 3.5–5.3)
PROT ?TM UR-MCNC: 7 MG/DL — SIGNIFICANT CHANGE UP
PROT ?TM UR-MCNC: 7 MG/DL — SIGNIFICANT CHANGE UP
PROT SERPL-MCNC: 6 G/DL — SIGNIFICANT CHANGE UP (ref 6–8.3)
PROT UR-MCNC: NEGATIVE — SIGNIFICANT CHANGE UP
PROT/CREAT UR-RTO: 0.2 RATIO — SIGNIFICANT CHANGE UP (ref 0–0.2)
PROTHROM AB SERPL-ACNC: 11.2 SEC — SIGNIFICANT CHANGE UP (ref 10.5–13.4)
RBC # BLD: 3.93 M/UL — SIGNIFICANT CHANGE UP (ref 3.8–5.2)
RBC # FLD: 13.9 % — SIGNIFICANT CHANGE UP (ref 10.3–14.5)
RBC CASTS # UR COMP ASSIST: 1 /HPF — SIGNIFICANT CHANGE UP (ref 0–4)
RH IG SCN BLD-IMP: POSITIVE — SIGNIFICANT CHANGE UP
SARS-COV-2 IGG+IGM SERPL QL IA: >250 U/ML — HIGH
SARS-COV-2 IGG+IGM SERPL QL IA: POSITIVE
SODIUM SERPL-SCNC: 134 MMOL/L — LOW (ref 135–145)
SP GR SPEC: 1.01 — SIGNIFICANT CHANGE UP (ref 1–1.05)
T PALLIDUM AB TITR SER: NEGATIVE — SIGNIFICANT CHANGE UP
URATE SERPL-MCNC: 4.2 MG/DL — SIGNIFICANT CHANGE UP (ref 2.5–7)
UROBILINOGEN FLD QL: SIGNIFICANT CHANGE UP
WBC # BLD: 8.39 K/UL — SIGNIFICANT CHANGE UP (ref 3.8–10.5)
WBC # FLD AUTO: 8.39 K/UL — SIGNIFICANT CHANGE UP (ref 3.8–10.5)
WBC UR QL: 17 /HPF — HIGH (ref 0–5)

## 2022-06-27 PROCEDURE — 59025 FETAL NON-STRESS TEST: CPT | Mod: 26

## 2022-06-27 PROCEDURE — 59514 CESAREAN DELIVERY ONLY: CPT | Mod: AS,U7

## 2022-06-27 RX ORDER — IBUPROFEN 200 MG
600 TABLET ORAL EVERY 6 HOURS
Refills: 0 | Status: COMPLETED | OUTPATIENT
Start: 2022-06-27 | End: 2023-05-26

## 2022-06-27 RX ORDER — TETANUS TOXOID, REDUCED DIPHTHERIA TOXOID AND ACELLULAR PERTUSSIS VACCINE, ADSORBED 5; 2.5; 8; 8; 2.5 [IU]/.5ML; [IU]/.5ML; UG/.5ML; UG/.5ML; UG/.5ML
0.5 SUSPENSION INTRAMUSCULAR ONCE
Refills: 0 | Status: DISCONTINUED | OUTPATIENT
Start: 2022-06-27 | End: 2022-06-29

## 2022-06-27 RX ORDER — SODIUM CHLORIDE 9 MG/ML
1000 INJECTION, SOLUTION INTRAVENOUS
Refills: 0 | Status: DISCONTINUED | OUTPATIENT
Start: 2022-06-27 | End: 2022-06-28

## 2022-06-27 RX ORDER — MAGNESIUM HYDROXIDE 400 MG/1
30 TABLET, CHEWABLE ORAL
Refills: 0 | Status: DISCONTINUED | OUTPATIENT
Start: 2022-06-27 | End: 2022-06-29

## 2022-06-27 RX ORDER — OXYCODONE HYDROCHLORIDE 5 MG/1
5 TABLET ORAL
Refills: 0 | Status: DISCONTINUED | OUTPATIENT
Start: 2022-06-27 | End: 2022-06-28

## 2022-06-27 RX ORDER — NALOXONE HYDROCHLORIDE 4 MG/.1ML
0.1 SPRAY NASAL
Refills: 0 | Status: DISCONTINUED | OUTPATIENT
Start: 2022-06-27 | End: 2022-06-28

## 2022-06-27 RX ORDER — OXYCODONE HYDROCHLORIDE 5 MG/1
5 TABLET ORAL ONCE
Refills: 0 | Status: DISCONTINUED | OUTPATIENT
Start: 2022-06-27 | End: 2022-06-29

## 2022-06-27 RX ORDER — OXYCODONE HYDROCHLORIDE 5 MG/1
5 TABLET ORAL
Refills: 0 | Status: DISCONTINUED | OUTPATIENT
Start: 2022-06-27 | End: 2022-06-29

## 2022-06-27 RX ORDER — SIMETHICONE 80 MG/1
80 TABLET, CHEWABLE ORAL EVERY 4 HOURS
Refills: 0 | Status: DISCONTINUED | OUTPATIENT
Start: 2022-06-27 | End: 2022-06-29

## 2022-06-27 RX ORDER — DIPHENHYDRAMINE HCL 50 MG
25 CAPSULE ORAL EVERY 6 HOURS
Refills: 0 | Status: DISCONTINUED | OUTPATIENT
Start: 2022-06-27 | End: 2022-06-29

## 2022-06-27 RX ORDER — ONDANSETRON 8 MG/1
4 TABLET, FILM COATED ORAL EVERY 6 HOURS
Refills: 0 | Status: DISCONTINUED | OUTPATIENT
Start: 2022-06-27 | End: 2022-06-28

## 2022-06-27 RX ORDER — CITRIC ACID/SODIUM CITRATE 300-500 MG
30 SOLUTION, ORAL ORAL ONCE
Refills: 0 | Status: COMPLETED | OUTPATIENT
Start: 2022-06-27 | End: 2022-06-27

## 2022-06-27 RX ORDER — DEXAMETHASONE 0.5 MG/5ML
4 ELIXIR ORAL EVERY 6 HOURS
Refills: 0 | Status: DISCONTINUED | OUTPATIENT
Start: 2022-06-27 | End: 2022-06-28

## 2022-06-27 RX ORDER — HEPARIN SODIUM 5000 [USP'U]/ML
5000 INJECTION INTRAVENOUS; SUBCUTANEOUS EVERY 12 HOURS
Refills: 0 | Status: DISCONTINUED | OUTPATIENT
Start: 2022-06-27 | End: 2022-06-29

## 2022-06-27 RX ORDER — OXYTOCIN 10 UNIT/ML
333.33 VIAL (ML) INJECTION
Qty: 20 | Refills: 0 | Status: DISCONTINUED | OUTPATIENT
Start: 2022-06-27 | End: 2022-06-28

## 2022-06-27 RX ORDER — OXYCODONE HYDROCHLORIDE 5 MG/1
10 TABLET ORAL
Refills: 0 | Status: DISCONTINUED | OUTPATIENT
Start: 2022-06-27 | End: 2022-06-28

## 2022-06-27 RX ORDER — KETOROLAC TROMETHAMINE 30 MG/ML
30 SYRINGE (ML) INJECTION EVERY 6 HOURS
Refills: 0 | Status: DISCONTINUED | OUTPATIENT
Start: 2022-06-27 | End: 2022-06-28

## 2022-06-27 RX ORDER — FAMOTIDINE 10 MG/ML
0.2 INJECTION INTRAVENOUS ONCE
Refills: 0 | Status: COMPLETED | OUTPATIENT
Start: 2022-06-27 | End: 2022-06-27

## 2022-06-27 RX ORDER — LANOLIN
1 OINTMENT (GRAM) TOPICAL EVERY 6 HOURS
Refills: 0 | Status: DISCONTINUED | OUTPATIENT
Start: 2022-06-27 | End: 2022-06-29

## 2022-06-27 RX ORDER — ACETAMINOPHEN 500 MG
975 TABLET ORAL
Refills: 0 | Status: DISCONTINUED | OUTPATIENT
Start: 2022-06-27 | End: 2022-06-29

## 2022-06-27 RX ORDER — NALBUPHINE HYDROCHLORIDE 10 MG/ML
2.5 INJECTION, SOLUTION INTRAMUSCULAR; INTRAVENOUS; SUBCUTANEOUS EVERY 6 HOURS
Refills: 0 | Status: DISCONTINUED | OUTPATIENT
Start: 2022-06-27 | End: 2022-06-28

## 2022-06-27 RX ADMIN — Medication 0.25 MILLIGRAM(S): at 09:47

## 2022-06-27 RX ADMIN — HEPARIN SODIUM 5000 UNIT(S): 5000 INJECTION INTRAVENOUS; SUBCUTANEOUS at 17:00

## 2022-06-27 RX ADMIN — Medication 975 MILLIGRAM(S): at 21:09

## 2022-06-27 RX ADMIN — Medication 30 MILLILITER(S): at 10:10

## 2022-06-27 RX ADMIN — Medication 975 MILLIGRAM(S): at 14:30

## 2022-06-27 RX ADMIN — SODIUM CHLORIDE 125 MILLILITER(S): 9 INJECTION, SOLUTION INTRAVENOUS at 06:36

## 2022-06-27 RX ADMIN — Medication 975 MILLIGRAM(S): at 20:39

## 2022-06-27 RX ADMIN — FAMOTIDINE 0.2 MILLIGRAM(S): 10 INJECTION INTRAVENOUS at 10:10

## 2022-06-27 NOTE — DISCHARGE NOTE OB - ADDITIONAL INSTRUCTIONS
monitor your blood pressure 3 times daily at home  keep a log of your blood pressures  if your blood pressure is high, above 140/90, call the office  if you have headache, vision changes, upper abdominal pain or chest pain, call the office  please make an appointment to be seen within 1 week of discharge from the hospital follow up in office in 1week   monitor your blood pressure 3 times daily at home  keep a log of your blood pressures  if your blood pressure is high, above 140/90, call the office  if you have headache, vision changes, upper abdominal pain or chest pain, call the office  please make an appointment to be seen within 1 week of discharge from the hospital

## 2022-06-27 NOTE — BRIEF OPERATIVE NOTE - IV INFUSIONS - CRYSTALLOIDS
1500 Interpolation Flap Text: A decision was made to reconstruct the defect utilizing an interpolation axial flap and a staged reconstruction.  A telfa template was made of the defect.  This telfa template was then used to outline the interpolation flap.  The donor area for the pedicle flap was then injected with anesthesia.  The flap was excised through the skin and subcutaneous tissue down to the layer of the underlying musculature.  The interpolation flap was carefully excised within this deep plane to maintain its blood supply.  The edges of the donor site were undermined.   The donor site was closed in a primary fashion.  The pedicle was then rotated into position and sutured.  Once the tube was sutured into place, adequate blood supply was confirmed with blanching and refill.  The pedicle was then wrapped with xeroform gauze and dressed appropriately with a telfa and gauze bandage to ensure continued blood supply and protect the attached pedicle.

## 2022-06-27 NOTE — DISCHARGE NOTE OB - PATIENT PORTAL LINK FT
You can access the FollowMyHealth Patient Portal offered by [facility] by registering at the following website: [email or web address]. By joining [facility]’s FollowMyHealth portal, you will also be able to view your health information using other applications (apps) compatible with our system.

## 2022-06-27 NOTE — BRIEF OPERATIVE NOTE - NSICDXBRIEFPREOP_GEN_ALL_CORE_FT
PRE-OP DIAGNOSIS:  Non-reassuring fetal cardiotocographic tracing 27-Jun-2022 12:34:18  Terri Owusu

## 2022-06-27 NOTE — OB RN DELIVERY SUMMARY - NSSELHIDDEN_OBGYN_ALL_OB_FT
[NS_DeliveryAttending1_OBGYN_ALL_OB_FT:[id number]],[NS_DeliveryAssist1_OBGYN_ALL_OB_FT:[id number]],[NS_DeliveryRN_OBGYN_ALL_OB_FT:[id number]=]

## 2022-06-27 NOTE — OB NEONATOLOGY/PEDIATRICIAN DELIVERY SUMMARY - NSPEDSNEONOTESA_OBGYN_ALL_OB_FT
Called by OBGYN to attend primary C/s delivery due to cat II FHTs. Baby is product of a 37.1 week gestation born to a  29 y.o. F. Maternal labs include Blood Type A+, GBS-, Hep B-, RPR NR, Rubella imm, HIV-, Covid-. 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 Pregnancy was complicated by gHTN . ROM on *** at *** with *** fluid, at approximately *** hours. Delivery complicated by ***. Baby emerged crying and vigorous***. Delayed cord clamping x30s performed***. Resuscitation included ***. Apgars were ***. EOS score: ***. Feeding: Breast exclusively***. Consents to hep B***. Declines circumcision***. Admit to . Called by OBGYN to attend primary C/s delivery due to cat II FHTs. Baby is product of a 37.1 week gestation born to a  31 y.o. F. Maternal labs include Blood Type A+, GBS-, Hep B-, RPR NR, Rubella imm, HIV-, Covid-. 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 Pregnancy was complicated by gHTN and  contractions requiring 48h admission  (received BMZ x2). AROM at delivery with clear fluid. Delivery complicated by nuchal x1. Baby emerged crying and vigorous. Delayed cord clamping x30s performed. Resuscitation included w/d/s/s. Apgars were 8/9. EOS score: N/A. Feeding: Breast exclusively. Consents to hep B. Admit to .

## 2022-06-27 NOTE — OB PROVIDER H&P - HISTORY OF PRESENT ILLNESS
R3 H&P    Pt is a 32y/o  at 37w1d admitted for scheduled IOL due to gHTN.  Prenatal course otherwise uncomplicated. Patient s/p admission due to  contractions. Reports she still has irregular contractions. Denies any VB, LOF, +FM. Denies headache, vision changes, RUQ pain. + LE swelling  GBS negative  EFW 3300 (reports 6lb3oz on sono )    OBHx: sAB x 1 s/p medication  GynHx: denies h/o abnormal paps, STI's, fibroids, cysts  PMHx: denies  PSHx: tonsillectomy, left knee surgery  Med: PNV, Vit D   All: NKDA  SH: denies alcohol, tobacco, or drug use  Psych: denies h/o anxiety or depression

## 2022-06-27 NOTE — CHART NOTE - NSCHARTNOTEFT_GEN_A_CORE
patient with another prolonged decel, FHT Cat II  still closed on exam  patient with recurrent late decelerations/prolonged decelerations over the last hour, remote from delivery, IOL for GHTN @ 37 wks   patient was counseled about r/b/a of proceeding with  delivery in detail  discussed risk of infection, bleeding, injury to bowel or bladder  patient and partner had an opportunity to ask all questions  anesthesia Dr. Drake present for c/s decision  informed consent obtained for c/s procedure and anesthesia  patient brought to OR 2

## 2022-06-27 NOTE — OB PROVIDER DELIVERY SUMMARY - NSPROVIDERDELIVERYNOTE_OBGYN_ALL_OB_FT
Procedure: primary low segment transverse c/s  Preop Dx: cat 2 tracing, gestational hypetension  EBL:  ml     QBL: 244ml  IVF:    UOP:   Layers of uterine closure: 2  Complications: none  Specimen: none   Findings: L paratubal cyst, otherwise normal appearing uterus and fallopian tubes and ovaries bilaterally   Hemostatic/Intraoperative agents: surgicel powder  Baby: Female, 6 lbs 11 oz, APGARS 8/9 per peds present @ delivery for cat 2 tracing Procedure: primary low segment transverse c/s  Preop Dx: cat 2 tracing, gestational hypertension  QBL: 407ml  IVF:  1500  UOP: 1200  Layers of uterine closure: 2  Complications: none  Specimen: none   Findings: L paratubal cyst, otherwise normal appearing uterus and fallopian tubes and ovaries bilaterally   Hemostatic/Intraoperative agents: surgicel powder  Baby: Female, 6 lbs 11 oz, APGARS 8/9 per peds present @ delivery for cat 2 tracing

## 2022-06-27 NOTE — DISCHARGE NOTE OB - NS MD DC FALL RISK RISK
For information on Fall & Injury Prevention, visit: [email or web address] OR  [email or web address] OR  https://www.cdc.gov/steadi/patient.html

## 2022-06-27 NOTE — DISCHARGE NOTE OB - NSDCQMSTAIRS_GEN_ALL_CORE
-  Pseudomonas from 6/20 bronchial wash. Continue Ciprofloxacin, Tobramycin x1 on 6/22.   - Repeat cultures from 6/23 with NGTD.    No

## 2022-06-27 NOTE — DISCHARGE NOTE OB - PLAN OF CARE
recovery monitor your blood pressure 3 times daily at home  keep a log of your blood pressures  if your blood pressure is high, above 140/90, call the office  if you have headache, vision changes, upper abdominal pain or chest pain, call the office  please make an appointment to be seen within 1 week of discharge from the hospital

## 2022-06-27 NOTE — DISCHARGE NOTE OB - CARE PROVIDER_API CALL
Barbara Youssef (DO)  Obstetrics and Gynecology  [address] [place] [address]  Phone: [phone]  Fax: [phone]  Established Patient  Follow Up Time: 1 week

## 2022-06-27 NOTE — OB RN DELIVERY SUMMARY - NS_SEPSISRSKCALC_OBGYN_ALL_OB_FT
EOS calculated successfully. EOS Risk Factor: 0.5/1000 live births ([facility] national incidence); GA=37w1d; Temp=97.9; ROM=0; GBS='Negative'; Antibiotics='No antibiotics or any antibiotics < 2 hrs prior to birth'

## 2022-06-27 NOTE — OB PROVIDER LABOR PROGRESS NOTE - NS_SUBJECTIVE/OBJECTIVE_OBGYN_ALL_OB_FT
R2 OB Labor Note    S: Patient seen and examined at bedside.     T(C): 36.5 (06-27-22 @ 01:37), Max: 36.6 (06-26-22 @ 22:53)  HR: 77 (06-27-22 @ 01:36) (77 - 78)  BP: 127/77 (06-27-22 @ 01:36) (127/77 - 130/86)  BP(mean): --  ABP: --  ABP(mean): --  RR: 16 (06-27-22 @ 01:37) (16 - 16)  SpO2: --  Wt(kg): --  CVP(mm Hg): --  CI: --  CAPILLARY BLOOD GLUCOSE       N/A      06-26 @ 07:01  -  06-27 @ 02:26  --------------------------------------------------------  IN:    Lactated Ringers: 375 mL  Total IN: 375 mL    OUT:  Total OUT: 0 mL    Total NET: 375 mL

## 2022-06-27 NOTE — BRIEF OPERATIVE NOTE - OPERATION/FINDINGS
Preop Dx: cat 2 tracing, gestational hypertension  QBL: 407ml  IVF:  1500  UOP: 1200  Layers of uterine closure: 2  Complications: none  Specimen: none   Findings: L paratubal cyst, otherwise normal appearing uterus and fallopian tubes and ovaries bilaterally   Hemostatic/Intraoperative agents: surgicel powder  Nuchal x1, cephalic presentation, clear copious fluids  Baby: Female, 6 lbs 11 oz, APGARS 8/9 per peds present @ delivery for cat 2 tracing

## 2022-06-27 NOTE — OB PROVIDER H&P - NSHPPHYSICALEXAM_GEN_ALL_CORE
Vital Signs Last 24 Hrs  T(C): 36.6 (27 Jun 2022 00:11), Max: 36.6 (26 Jun 2022 22:53)  T(F): 97.9 (27 Jun 2022 00:11), Max: 97.9 (27 Jun 2022 00:11)  HR: 78 (27 Jun 2022 00:11) (78 - 78)  BP: 130/86 (27 Jun 2022 00:11) (130/86 - 130/86)  BP(mean): --  RR: 16 (27 Jun 2022 00:11) (16 - 16)  SpO2: --  General: well appearing, NAD   Lungs: breathing comfortably on room air  EFH: baseline 130s, moderate variability, accelerations present, no decels  [place]: quiet  VE: 0/0/-3  TAUS: vertex

## 2022-06-27 NOTE — OB PROVIDER H&P - PRO FEEDING PLAN INFANT OB
64M with PMH of CLL, DM, parathyroid cancer, and schizophrenia presenting to ED from [facility] s/p unwitnessed fall last night w/ +HT, ?LOC, +ASA and plavix. Of note, pt is a very poor historian and denies falling. In the ED, pt was febrile to 102 with BP 90/60. Endorses 6 days of non-productive cough. Denies nausea, vomiting, diarrhea, any pain, HA, blurry vision, or SOB. initiation of breastfeeding/breast milk feeding

## 2022-06-27 NOTE — OB RN INTRAOPERATIVE NOTE - NSSELHIDDEN_OBGYN_ALL_OB_FT
[NS_DeliveryAttending1_OBGYN_ALL_OB_FT:[id number]],[NS_DeliveryAssist1_OBGYN_ALL_OB_FT:[id number]],[NS_DeliveryRN_OBGYN_ALL_OB_FT:[id number]=]

## 2022-06-27 NOTE — OB RN INTRAOPERATIVE NOTE - NS_DURAMORPH_OBGYN_ALL_OB
Telephone Encounter by Ana Cristina Daniel NCMA at 03/05/18 11:35 AM     Author:  Ana Cristina Daniel NCMA Service:  (none) Author Type:  Certified Medical Assistant     Filed:  03/05/18 11:36 AM Encounter Date:  3/4/2018 Status:  Signed     :  Ana Cristina Daniel NCMA (Certified Medical Assistant)            FWD to [facility][BP1.1M]       Revision History        User Key Date/Time User Provider Type Action    > BP1.1 03/05/18 11:36 AM Ana Cristina Daniel NCMA Certified Medical Assistant Sign    M - Manual             Yes

## 2022-06-27 NOTE — OB PROVIDER H&P - ASSESSMENT
A&P:   Labor: admit to L&D  -PO cytotec  -routine labs + HELLP labs  -EFM/toco  -CLD, IV hydration  Fetal: cat 1 tracing, fetal status reassuring  GBS: neg      Discussed with Dr. Danni Garza PGY-3

## 2022-06-27 NOTE — OB PROVIDER LABOR PROGRESS NOTE - ASSESSMENT
A/P:   - Labor: IOL for gHTN on PO, will continue PO per plan with Dr Razo  - Fetus: cat 2, improved with resuscitation, overall reassuring given variability    Kimi Cheek, PGY-2

## 2022-06-27 NOTE — OB PROVIDER LABOR PROGRESS NOTE - ASSESSMENT
[facility] OB Progress Note    Called to room to evaluate patient for prolonged fetal deceleration.    Patient seen and evaluated at bedside.  Denies complaints.  Comfortable.     T(C): 36.6 (06-27-22 @ 07:18), Max: 36.6 (06-26-22 @ 22:53)  HR: 69 (06-27-22 @ 05:52) (69 - 87)  BP: 114/68 (06-27-22 @ 05:52) (114/68 - 140/86)  RR: 16 (06-27-22 @ 05:53) (16 - 16)  SpO2: --    SVE: 1/50/-3    EFM: 135 bpm, moderate variability, +accels. + prolonged late deceleration  Tiro:  irregular ctx pattern    A/P 31y P0 @ 37wks admitted for IOL for GHTN. HELLP WNL. PC ratio 0.2      -Fetus: EFW 6#3  -GBS negative  -Analgesia: none  -Induction with: PO Cytotec    -VE unchanged  -patient repositioned to left and right lateral   -IV LR fluids bolus given now  -Oxygen via non-rebreather mask  -Terbutaline drawn by RN , not given at this time  -reposition PRN  -continue with PO Cytotec IOL, last dose Cytotec 20mcg dose #3 given at 6am  -Continue to monitor with EFM & TOCO  -Recheck patient in 2-4 hours or PRN    Plan of care reviewed with patient, patient verbalizes understanding and agrees to plan.    Discussed with MD Mikael Griffin

## 2022-06-27 NOTE — DISCHARGE NOTE OB - CARE PLAN
Principal Discharge DX:	 delivery delivered  Assessment and plan of treatment:	recovery  Secondary Diagnosis:	Gestational hypertension  Assessment and plan of treatment:	monitor your blood pressure 3 times daily at home  keep a log of your blood pressures  if your blood pressure is high, above 140/90, call the office  if you have headache, vision changes, upper abdominal pain or chest pain, call the office  please make an appointment to be seen within 1 week of discharge from the hospital   1

## 2022-06-27 NOTE — OB PROVIDER H&P - NS_ZIKATRAVEL_OBGYN_ALL_OB
July 13, 2020       Thomas Ariza MD  [address] [facility] [address]  VIA Facsimile: [phone]      Patient: Autumn Hanson   YOB: 1943   Date of Visit: 7/13/2020       Dear Dr. Ariza:    Thank you for referring Autumn Hanson to me for evaluation. Below are my notes for this visit with her.    If you have questions, please do not hesitate to call me. I look forward to following your patient along with you.      Sincerely,        Basil Nino MD        CC: No Recipients  Basil Nino MD  7/13/2020  2:26 PM  Sign when Signing Visit                                        [facility]                               Cardiology      PCP: Thomas Ariza MD    Chief Complaint   Patient presents with   • Follow-up     Patient is here for a 3 month follow up appt.         HPI   Autumn Hanson is a 76 year old White female here today.  The patient is here for follow-up.  Since last visit she has been doing well and denies chest pain, shortness of breath, palpitations, heart racing, dizziness, or syncope.  No leg edema, orthopnea, or paroxysmal nocturnal dyspnea.  She has not had her echo yet due to the COVID-19 emergency.  She is tolerating her medications well without side effects.    PAST MEDICAL HX:    CHF (congestive heart failure) (CMS/HCC)                      CKD (chronic kidney disease)                                  Hypertension                                                  Hyperlipidemia                                                Myeloma (CMS/HCC)                                             GERD (gastroesophageal reflux disease)                        PAST SURGICAL HX:    CHOLECYSTECTOMY                                               KIDNEY SURGERY                                                LITHOTRIPSY IN COLON                                          KYPHOPLASTY                                                   BACK SURGERY                                                   Family History   Problem Relation Age of Onset   • Stroke Mother    • Emphysema Father      Review of patient's family status indicates:    Mother                                           Father                                             Social History     Socioeconomic History   • Marital status: /Civil Union     Spouse name: Not on file   • Number of children: Not on file   • Years of education: Not on file   • Highest education level: Not on file   Occupational History   • Not on file   Social Needs   • Financial resource strain: Not on file   • Food insecurity:     Worry: Not on file     Inability: Not on file   • Transportation needs:     Medical: Not on file     Non-medical: Not on file   Tobacco Use   • Smoking status: Former Smoker     Packs/day: 0.00     Last attempt to quit: 2004     Years since quitting: 15.8   • Smokeless tobacco: Never Used   Substance and Sexual Activity   • Alcohol use: Not Currently   • Drug use: Never   • Sexual activity: Not on file   Lifestyle   • Physical activity:     Days per week: Not on file     Minutes per session: Not on file   • Stress: Not on file   Relationships   • Social connections:     Talks on phone: Not on file     Gets together: Not on file     Attends Restorationism service: Not on file     Active member of club or organization: Not on file     Attends meetings of clubs or organizations: Not on file     Relationship status: Not on file   • Intimate partner violence:     Fear of current or ex partner: Not on file     Emotionally abused: Not on file     Physically abused: Not on file     Forced sexual activity: Not on file   Other Topics Concern   • Not on file   Social History Narrative   • Not on file        Allergies  ALLERGIES:  No Known Allergies     Current Medications  Current Outpatient Medications   Medication Sig Dispense Refill   • amLODIPine (NORVASC) 5 MG tablet TAKE 1 TABLET EVERY DAY 90 tablet 1   • acyclovir  (ZOVIRAX) 400 MG tablet Take 400 mg by mouth daily.     • pravastatin (PRAVACHOL) 80 MG tablet Take 80 mg by mouth daily.     • mirtazapine (REMERON) 7.5 MG tablet Take 7.5 mg by mouth nightly.     • pantoprazole (PROTONIX) 40 MG tablet Take 40 mg by mouth daily.     • MAGNESIUM OXIDE PO Take 400 mg by mouth daily.     • Probiotic Product (PROBIOTIC-10 PO)      • CARFILZOMIB IV      • DENOSUMAB SC      • Dexameth-Moxiflox-Ketorolac 1-0.5-0.4 MG/ML Solution        No current facility-administered medications for this visit.         Review of Systems  Review of Systems   HENT: Negative for nosebleeds.    Cardiovascular: Negative for chest pain, claudication, dyspnea on exertion, irregular heartbeat, leg swelling, near-syncope, orthopnea, palpitations, paroxysmal nocturnal dyspnea and syncope.   Hematologic/Lymphatic: Negative for bleeding problem. Does not bruise/bleed easily.   Gastrointestinal: Negative for hematochezia and melena.   Genitourinary: Negative for hematuria.          Visit Vitals  /68 (BP Location: LUE - Left upper extremity, Patient Position: Sitting, Cuff Size: Regular)   Pulse 64   Ht 5' (1.524 m)   Wt 47.6 kg (105 lb)   BMI 20.51 kg/m²     Vital signs were reviewed today.    Physical Exam   Constitutional: She is oriented to person, place, and time. She appears well-developed and well-nourished. She is cooperative.   HENT:   Head: Normocephalic and atraumatic.   Eyes: Pupils are equal, round, and reactive to light. Conjunctivae and EOM are normal. No scleral icterus.   Neck: Normal carotid pulses, no hepatojugular reflux and no JVD present. Carotid bruit is not present.   Cardiovascular: Normal rate, regular rhythm, S1 normal, S2 normal and intact distal pulses. PMI is not displaced. Exam reveals no gallop.   No murmur heard.  Pulses:       Carotid pulses are 2+ on the right side and 2+ on the left side.       Dorsalis pedis pulses are 2+ on the right side and 2+ on the left side.   No leg  edema   Pulmonary/Chest: Effort normal and breath sounds normal. No respiratory distress. She has no wheezes. She has no rales. She exhibits no tenderness.   Abdominal: Soft. Bowel sounds are normal. She exhibits no distension, no abdominal bruit and no mass. There is no hepatosplenomegaly. There is no abdominal tenderness. There is no rebound and no guarding.   Musculoskeletal: Normal range of motion.   Neurological: She is alert and oriented to person, place, and time. No cranial nerve deficit.   Skin: Skin is warm and dry. No rash noted. She is not diaphoretic.   Psychiatric: She has a normal mood and affect. Her behavior is normal.         Recent Labs:   No results found for: CHOHDL  No results found for: HDL  No components found for: LDL  No results for input(s): WBC, RBC, HGB, HCT, MCV, MCHC, RDWCV, PLT, TLYMPH in the last 8765 hours.  No results for input(s): DBIL, GPT, TP in the last 8765 hours.    Invalid input(s): ALB, TBIL, ALKP, GOT         Assessment :  Patient Active Problem List   Diagnosis   • Chronic diastolic heart failure (CMS/HCC)   • Hypertension, essential   • Pure hypercholesterolemia   • Multiple myeloma in relapse (CMS/HCC)   • Abnormal finding on echocardiogram        Plan:  Problem List Items Addressed This Visit        Circulatory    Chronic diastolic heart failure (CMS/HCC) - Primary     Continues to do well with functional class II symptoms.  Patient will proceed with echocardiogram with Doppler and strain to reassess.         Hypertension, essential     Hypertension is Well-controlled at this time with guideline directed medical therapy which is well-tolerated.  Continue current treatment regimen.  Blood pressure will be reassessed at the next regular appointment.            Endocrine    Pure hypercholesterolemia     Lipid abnormalities are Treated with moderate intensity statins which are well-tolerated.  Continue present management.  Lipids will be reassessed By PCP.             Oncologic    Multiple myeloma in relapse (CMS/HCC)     Management per medical oncology.            Greater than 50% of the visit was spent counseling regarding above issues; total time spent 25 minutes.            No

## 2022-06-27 NOTE — DISCHARGE NOTE OB - MEDICATION SUMMARY - MEDICATIONS TO TAKE
I will START or STAY ON the medications listed below when I get home from the hospital:  None I will START or STAY ON the medications listed below when I get home from the hospital:    acetaminophen 325 mg oral tablet  -- 3 tab(s) by mouth every 6 hours, As Needed  -- Indication: For pain    ibuprofen 600 mg oral tablet  -- 1 tab(s) by mouth every 6 hours, As Needed  -- Indication: For pain    ferrous sulfate 325 mg (65 mg elemental iron) oral tablet  -- 1 tab(s) by mouth every 8 hours  -- Indication: For anemia

## 2022-06-28 LAB
BASOPHILS # BLD AUTO: 0.03 K/UL — SIGNIFICANT CHANGE UP (ref 0–0.2)
BASOPHILS NFR BLD AUTO: 0.3 % — SIGNIFICANT CHANGE UP (ref 0–2)
EOSINOPHIL # BLD AUTO: 0.02 K/UL — SIGNIFICANT CHANGE UP (ref 0–0.5)
EOSINOPHIL NFR BLD AUTO: 0.2 % — SIGNIFICANT CHANGE UP (ref 0–6)
HCT VFR BLD CALC: 25.7 % — LOW (ref 34.5–45)
HGB BLD-MCNC: 8.2 G/DL — LOW (ref 11.5–15.5)
IANC: 6.58 K/UL — SIGNIFICANT CHANGE UP (ref 1.8–7.4)
IMM GRANULOCYTES NFR BLD AUTO: 0.6 % — SIGNIFICANT CHANGE UP (ref 0–1.5)
LYMPHOCYTES # BLD AUTO: 1.42 K/UL — SIGNIFICANT CHANGE UP (ref 1–3.3)
LYMPHOCYTES # BLD AUTO: 16 % — SIGNIFICANT CHANGE UP (ref 13–44)
MCHC RBC-ENTMCNC: 25.9 PG — LOW (ref 27–34)
MCHC RBC-ENTMCNC: 31.9 GM/DL — LOW (ref 32–36)
MCV RBC AUTO: 81.3 FL — SIGNIFICANT CHANGE UP (ref 80–100)
MONOCYTES # BLD AUTO: 0.78 K/UL — SIGNIFICANT CHANGE UP (ref 0–0.9)
MONOCYTES NFR BLD AUTO: 8.8 % — SIGNIFICANT CHANGE UP (ref 2–14)
NEUTROPHILS # BLD AUTO: 6.58 K/UL — SIGNIFICANT CHANGE UP (ref 1.8–7.4)
NEUTROPHILS NFR BLD AUTO: 74.1 % — SIGNIFICANT CHANGE UP (ref 43–77)
NRBC # BLD: 0 /100 WBCS — SIGNIFICANT CHANGE UP
NRBC # FLD: 0 K/UL — SIGNIFICANT CHANGE UP
PLATELET # BLD AUTO: 118 K/UL — LOW (ref 150–400)
RBC # BLD: 3.16 M/UL — LOW (ref 3.8–5.2)
RBC # FLD: 14.2 % — SIGNIFICANT CHANGE UP (ref 10.3–14.5)
WBC # BLD: 8.88 K/UL — SIGNIFICANT CHANGE UP (ref 3.8–10.5)
WBC # FLD AUTO: 8.88 K/UL — SIGNIFICANT CHANGE UP (ref 3.8–10.5)

## 2022-06-28 RX ORDER — ACETAMINOPHEN 500 MG
3 TABLET ORAL
Qty: 0 | Refills: 0 | DISCHARGE
Start: 2022-06-28

## 2022-06-28 RX ORDER — FERROUS SULFATE 325(65) MG
325 TABLET ORAL EVERY 8 HOURS
Refills: 0 | Status: DISCONTINUED | OUTPATIENT
Start: 2022-06-28 | End: 2022-06-29

## 2022-06-28 RX ORDER — IBUPROFEN 200 MG
1 TABLET ORAL
Qty: 0 | Refills: 0 | DISCHARGE
Start: 2022-06-28

## 2022-06-28 RX ORDER — IBUPROFEN 200 MG
600 TABLET ORAL EVERY 6 HOURS
Refills: 0 | Status: DISCONTINUED | OUTPATIENT
Start: 2022-06-28 | End: 2022-06-29

## 2022-06-28 RX ORDER — ASCORBIC ACID 60 MG
500 TABLET,CHEWABLE ORAL DAILY
Refills: 0 | Status: DISCONTINUED | OUTPATIENT
Start: 2022-06-28 | End: 2022-06-29

## 2022-06-28 RX ORDER — SENNA PLUS 8.6 MG/1
2 TABLET ORAL
Refills: 0 | Status: DISCONTINUED | OUTPATIENT
Start: 2022-06-28 | End: 2022-06-29

## 2022-06-28 RX ADMIN — Medication 600 MILLIGRAM(S): at 14:00

## 2022-06-28 RX ADMIN — Medication 975 MILLIGRAM(S): at 11:04

## 2022-06-28 RX ADMIN — HEPARIN SODIUM 5000 UNIT(S): 5000 INJECTION INTRAVENOUS; SUBCUTANEOUS at 18:12

## 2022-06-28 RX ADMIN — Medication 30 MILLIGRAM(S): at 00:49

## 2022-06-28 RX ADMIN — Medication 500 MILLIGRAM(S): at 21:29

## 2022-06-28 RX ADMIN — Medication 30 MILLIGRAM(S): at 05:13

## 2022-06-28 RX ADMIN — Medication 600 MILLIGRAM(S): at 21:29

## 2022-06-28 RX ADMIN — Medication 325 MILLIGRAM(S): at 15:28

## 2022-06-28 RX ADMIN — HEPARIN SODIUM 5000 UNIT(S): 5000 INJECTION INTRAVENOUS; SUBCUTANEOUS at 05:15

## 2022-06-28 RX ADMIN — Medication 30 MILLIGRAM(S): at 05:43

## 2022-06-28 RX ADMIN — Medication 600 MILLIGRAM(S): at 13:02

## 2022-06-28 RX ADMIN — Medication 975 MILLIGRAM(S): at 18:12

## 2022-06-28 RX ADMIN — Medication 325 MILLIGRAM(S): at 21:29

## 2022-06-28 RX ADMIN — Medication 30 MILLIGRAM(S): at 01:19

## 2022-06-28 RX ADMIN — Medication 600 MILLIGRAM(S): at 21:59

## 2022-06-28 RX ADMIN — Medication 975 MILLIGRAM(S): at 12:00

## 2022-06-28 NOTE — LACTATION INITIAL EVALUATION - LACTATION INTERVENTIONS
Mom educated about babies less than 24 hours of age will be sleepy. Made aware of cluster feeding that occurs after 24 hours of life and to be cautious of sleep deprivation in order to maintain infant and mother safety. Instructed to place infant in bassinet or call for assistance if feeling sleepy or tired.Recognition of feeding cues and to feed the baby on demand based on cues at least 8-12 times in a day. Instructed pt. to wake the baby to feed if no feeding cues are seen within 3h since prior feed. Pt. educated on the nutritional needs of the baby, how many wet and dirty diapers to expect, along with the amount of times the baby needs to be placed on the breast at this time.  use  feeding log to record feedings along with wet and dirty diapers. instructed in hand expression with good return demonstration.  Reviewed safe skin to skin. Verbalized understanding of education./initiate/review safe skin-to-skin/initiate/review hand expression/initiate/review techniques for position and latch/review techniques to increase milk supply/review techniques to manage sore nipples/engorgement/initiate/review breast massage/compression/reviewed importance of monitoring infant diapers, the breastfeeding log, and minimum output each day/reviewed risks of unnecessary formula supplementation/reviewed risks of artificial nipples/reviewed strategies to transition to breastfeeding only/reviewed benefits and recommendations for rooming in/reviewed feeding on demand/by cue at least 8 times a day/reviewed indications of inadequate milk transfer that would require supplementation

## 2022-06-28 NOTE — PROGRESS NOTE ADULT - ASSESSMENT
30y/o  POD#1 from pLTCS,  for nrFHT. Pregnancy complicated by gHTN. H/H 8.2/25.7. No current issues.     #gHTN   - Monitor BPs- 100-120s/60-80s overnight     #Postoperative state   - Continue with po analgesia  - Increase ambulation  - Continue regular diet  - Incision dressing removed    Debbie Garza  PGY-3

## 2022-06-28 NOTE — LACTATION INITIAL EVALUATION - INTERVENTION OUTCOME
Discussed  prevention  and  treatment of  sore nipples using colostrum care and/or lanolin. Mother and Infant roomed-in.   Mother educated on safe skin to skin care, safe sleep practices and environment. Call bell within reach./verbalizes understanding/demonstrates understanding of teaching/good return demonstration/needs met/Lactation team to follow up

## 2022-06-29 VITALS
DIASTOLIC BLOOD PRESSURE: 86 MMHG | RESPIRATION RATE: 17 BRPM | TEMPERATURE: 98 F | SYSTOLIC BLOOD PRESSURE: 136 MMHG | HEART RATE: 63 BPM | OXYGEN SATURATION: 100 %

## 2022-06-29 LAB
BASOPHILS # BLD AUTO: 0.04 K/UL — SIGNIFICANT CHANGE UP (ref 0–0.2)
BASOPHILS NFR BLD AUTO: 0.5 % — SIGNIFICANT CHANGE UP (ref 0–2)
EOSINOPHIL # BLD AUTO: 0.11 K/UL — SIGNIFICANT CHANGE UP (ref 0–0.5)
EOSINOPHIL NFR BLD AUTO: 1.4 % — SIGNIFICANT CHANGE UP (ref 0–6)
HCT VFR BLD CALC: 28.5 % — LOW (ref 34.5–45)
HGB BLD-MCNC: 8.7 G/DL — LOW (ref 11.5–15.5)
IANC: 5.5 K/UL — SIGNIFICANT CHANGE UP (ref 1.8–7.4)
IMM GRANULOCYTES NFR BLD AUTO: 0.7 % — SIGNIFICANT CHANGE UP (ref 0–1.5)
LYMPHOCYTES # BLD AUTO: 1.43 K/UL — SIGNIFICANT CHANGE UP (ref 1–3.3)
LYMPHOCYTES # BLD AUTO: 18.7 % — SIGNIFICANT CHANGE UP (ref 13–44)
MCHC RBC-ENTMCNC: 25.7 PG — LOW (ref 27–34)
MCHC RBC-ENTMCNC: 30.5 GM/DL — LOW (ref 32–36)
MCV RBC AUTO: 84.3 FL — SIGNIFICANT CHANGE UP (ref 80–100)
MONOCYTES # BLD AUTO: 0.5 K/UL — SIGNIFICANT CHANGE UP (ref 0–0.9)
MONOCYTES NFR BLD AUTO: 6.6 % — SIGNIFICANT CHANGE UP (ref 2–14)
NEUTROPHILS # BLD AUTO: 5.5 K/UL — SIGNIFICANT CHANGE UP (ref 1.8–7.4)
NEUTROPHILS NFR BLD AUTO: 72.1 % — SIGNIFICANT CHANGE UP (ref 43–77)
NRBC # BLD: 0 /100 WBCS — SIGNIFICANT CHANGE UP
NRBC # FLD: 0 K/UL — SIGNIFICANT CHANGE UP
PLATELET # BLD AUTO: 151 K/UL — SIGNIFICANT CHANGE UP (ref 150–400)
RBC # BLD: 3.38 M/UL — LOW (ref 3.8–5.2)
RBC # FLD: 14.4 % — SIGNIFICANT CHANGE UP (ref 10.3–14.5)
WBC # BLD: 7.63 K/UL — SIGNIFICANT CHANGE UP (ref 3.8–10.5)
WBC # FLD AUTO: 7.63 K/UL — SIGNIFICANT CHANGE UP (ref 3.8–10.5)

## 2022-06-29 RX ORDER — FERROUS SULFATE 325(65) MG
1 TABLET ORAL
Qty: 0 | Refills: 0 | DISCHARGE
Start: 2022-06-29

## 2022-06-29 RX ADMIN — Medication 975 MILLIGRAM(S): at 12:35

## 2022-06-29 RX ADMIN — Medication 975 MILLIGRAM(S): at 05:38

## 2022-06-29 RX ADMIN — Medication 975 MILLIGRAM(S): at 00:27

## 2022-06-29 RX ADMIN — Medication 500 MILLIGRAM(S): at 11:52

## 2022-06-29 RX ADMIN — Medication 600 MILLIGRAM(S): at 10:00

## 2022-06-29 RX ADMIN — Medication 975 MILLIGRAM(S): at 11:51

## 2022-06-29 RX ADMIN — Medication 600 MILLIGRAM(S): at 17:40

## 2022-06-29 RX ADMIN — Medication 600 MILLIGRAM(S): at 09:13

## 2022-06-29 RX ADMIN — Medication 600 MILLIGRAM(S): at 02:07

## 2022-06-29 RX ADMIN — Medication 975 MILLIGRAM(S): at 00:57

## 2022-06-29 RX ADMIN — Medication 975 MILLIGRAM(S): at 05:08

## 2022-06-29 RX ADMIN — Medication 325 MILLIGRAM(S): at 17:40

## 2022-06-29 RX ADMIN — HEPARIN SODIUM 5000 UNIT(S): 5000 INJECTION INTRAVENOUS; SUBCUTANEOUS at 05:08

## 2022-06-29 RX ADMIN — Medication 325 MILLIGRAM(S): at 05:08

## 2022-06-29 RX ADMIN — Medication 600 MILLIGRAM(S): at 02:37

## 2022-06-29 NOTE — PROGRESS NOTE ADULT - SUBJECTIVE AND OBJECTIVE BOX
Postop Day  1  s/p   C- Section    THERAPY:  [ X] Spinal morphine         Sedation Score:	  [ X] Alert	    [  ] Drowsy        [  ] Arousable	[  ] Asleep	[  ] Unresponsive    Side Effects:	  [ X] None	     [  ] Nausea        [  ] Pruritus        [  ] Weakness   [  ] Numbness      No headache. Able to ambulate and void. Tolerates diet.    ASSESSMENT/ PLAN   Change to po prn pain meds 24 hours post Spinal Morphine.  [ x ]Documentation and Verification of current medications   
R3 Progress Note POD#1     Patient seen and examined at bedside, no acute overnight events. No acute complaints, pain well controlled. Patient is ambulating and tolerating regular diet. Has been passing flatus and voiding spontaneously. Denies CP, SOB, N/V, HA, blurred vision, epigastric pain.    Vital Signs Last 24 Hours  T(C): 36.6 (06-28-22 @ 06:14), Max: 36.9 (06-27-22 @ 22:20)  HR: 73 (06-28-22 @ 06:14) (69 - 92)  BP: 109/62 (06-28-22 @ 06:14) (109/62 - 146/80)  RR: 18 (06-28-22 @ 06:14) (12 - 22)  SpO2: 99% (06-28-22 @ 06:14) (97% - 100%)    I&O's Summary    27 Jun 2022 07:01  -  28 Jun 2022 07:00  --------------------------------------------------------  IN: 1975 mL / OUT: 3262 mL / NET: -1287 mL        Physical Exam:  General: NAD  Abdomen: Soft, non-tender, non-distended, fundus firm  Incision: Pfannenstiel incision CDI, subcuticular suture closure  Pelvic: Lochia wnl    Labs:    Blood Type: A Positive  Antibody Screen: Negative  RPR: Negative               8.2    8.88  )-----------( 118      ( 06-28 @ 06:25 )             25.7                10.1   8.39  )-----------( 161      ( 06-26 @ 23:30 )             31.6                11.2   9.38  )-----------( 157      ( 05-30 @ 15:18 )             34.6         MEDICATIONS  (STANDING):  acetaminophen     Tablet .. 975 milliGRAM(s) Oral <User Schedule>  diphtheria/tetanus/pertussis (acellular) Vaccine (ADAcel) 0.5 milliLiter(s) IntraMuscular once  heparin   Injectable 5000 Unit(s) SubCutaneous every 12 hours  ibuprofen  Tablet. 600 milliGRAM(s) Oral every 6 hours  ketorolac   Injectable 30 milliGRAM(s) IV Push every 6 hours  lactated ringers. 1000 milliLiter(s) (75 mL/Hr) IV Continuous <Continuous>  lactated ringers. 1000 milliLiter(s) (125 mL/Hr) IV Continuous <Continuous>  oxytocin Infusion 333.333 milliUNIT(s)/Min (1000 mL/Hr) IV Continuous <Continuous>  oxytocin Infusion 333.333 milliUNIT(s)/Min (1000 mL/Hr) IV Continuous <Continuous>    MEDICATIONS  (PRN):  dexAMETHasone  Injectable 4 milliGRAM(s) IV Push every 6 hours PRN Nausea  diphenhydrAMINE 25 milliGRAM(s) Oral every 6 hours PRN Pruritus  lanolin Ointment 1 Application(s) Topical every 6 hours PRN Sore Nipples  magnesium hydroxide Suspension 30 milliLiter(s) Oral two times a day PRN Constipation  nalbuphine Injectable 2.5 milliGRAM(s) IV Push every 6 hours PRN Pruritus  naloxone Injectable 0.1 milliGRAM(s) IV Push every 3 minutes PRN For ANY of the following changes in patient status:  A. Breaths Per Minute LESS THAN 10, B. Oxygen saturation LESS THAN 90%, C. Sedation score of 6 for Stop After: 4 Times  ondansetron Injectable 4 milliGRAM(s) IV Push every 6 hours PRN Nausea  oxyCODONE    IR 5 milliGRAM(s) Oral every 3 hours PRN Mild Pain (1 - 3)  oxyCODONE    IR 10 milliGRAM(s) Oral every 3 hours PRN Moderate Pain (4 - 6)  oxyCODONE    IR 5 milliGRAM(s) Oral every 3 hours PRN Moderate to Severe Pain (4-10)  oxyCODONE    IR 5 milliGRAM(s) Oral once PRN Moderate to Severe Pain (4-10)  simethicone 80 milliGRAM(s) Chew every 4 hours PRN Gas  
Post-Operative Note, C/S  She is a  31y woman who is now post-operative day: 2    Subjective:  The patient feels well.  She is ambulating.   She is tolerating regular diet.  She denies nausea and vomiting; denies fever.  She is voiding.  Her pain is controlled; incisional pain is appropriate.  She reports normal postpartum bleeding.  She is breastfeeding.  She is formula feeding.    Physical exam:    Vital Signs Last 24 Hrs  T(C): 36.4 (29 Jun 2022 14:00), Max: 36.9 (28 Jun 2022 18:13)  T(F): 97.5 (29 Jun 2022 14:00), Max: 98.5 (28 Jun 2022 22:50)  HR: 62 (29 Jun 2022 14:00) (62 - 74)  BP: 133/88 (29 Jun 2022 14:00) (127/88 - 137/87)  BP(mean): --  RR: 18 (29 Jun 2022 14:00) (17 - 19)  SpO2: 100% (29 Jun 2022 14:00) (99% - 100%)    Gen: NAD  Breast: Soft, nontender, not engorged.  Abdomen: Soft, nontender, no distension , firm uterine fundus at umbilicus.  Incision: C/D/I.  Pelvic: Normal lochia noted  Ext: No calf tenderness    LABS:                        8.7    7.63  )-----------( 151      ( 29 Jun 2022 05:15 )             28.5       Rubella status:     Allergies    penicillin (Other)    Intolerances      MEDICATIONS  (STANDING):  acetaminophen     Tablet .. 975 milliGRAM(s) Oral <User Schedule>  ascorbic acid 500 milliGRAM(s) Oral daily  diphtheria/tetanus/pertussis (acellular) Vaccine (ADAcel) 0.5 milliLiter(s) IntraMuscular once  ferrous    sulfate 325 milliGRAM(s) Oral every 8 hours  heparin   Injectable 5000 Unit(s) SubCutaneous every 12 hours  ibuprofen  Tablet. 600 milliGRAM(s) Oral every 6 hours    MEDICATIONS  (PRN):  diphenhydrAMINE 25 milliGRAM(s) Oral every 6 hours PRN Pruritus  lanolin Ointment 1 Application(s) Topical every 6 hours PRN Sore Nipples  magnesium hydroxide Suspension 30 milliLiter(s) Oral two times a day PRN Constipation  oxyCODONE    IR 5 milliGRAM(s) Oral every 3 hours PRN Moderate to Severe Pain (4-10)  oxyCODONE    IR 5 milliGRAM(s) Oral once PRN Moderate to Severe Pain (4-10)  senna 2 Tablet(s) Oral two times a day PRN Constipation  simethicone 80 milliGRAM(s) Chew every 4 hours PRN Gas

## 2022-06-29 NOTE — PROGRESS NOTE ADULT - ASSESSMENT
Assessment and Plan  POD #2 s/p P/C/S for category 2 tracing.  GHTN, HELLP labs WNL, PC ratio 0.2    Her pain is well controlled.   She is tolerating a regular diet and passing flatus.   Denies N/V. Denies CP/SOB/lightheadedness/dizziness.   She is ambulating without difficulty.   Voiding spontaneously.    Patient is stable and doing well post-operatively.    - Continue regular diet.  - Increase ambulation.  - Continue motrin, tylenol, oxycodone PRN for pain control.   -Encourage breastfeeding.  -Incisional care and PO instructions reviewed.     Follow up @ [facility] in 1 weeks for PP BP CHECK visit  [phone]    Discussed with MD Eitan Griffin

## 2022-06-30 ENCOUNTER — NON-APPOINTMENT (OUTPATIENT)
Age: 32
End: 2022-06-30

## 2022-06-30 RX ORDER — MISOPROSTOL 200 UG/1
200 TABLET ORAL
Qty: 5 | Refills: 1 | Status: DISCONTINUED | COMMUNITY
Start: 2021-08-12 | End: 2022-06-30

## 2022-06-30 RX ORDER — NORGESTIMATE AND ETHINYL ESTRADIOL 7DAYSX3 28
0.18/0.215/0.25 KIT ORAL
Qty: 28 | Refills: 0 | Status: DISCONTINUED | COMMUNITY
Start: 2018-11-12 | End: 2022-06-30

## 2022-06-30 RX ORDER — PRENATAL VIT NO.130/IRON/FOLIC 27MG-0.8MG
27-0.8 TABLET ORAL DAILY
Refills: 0 | Status: ACTIVE | COMMUNITY
Start: 2022-06-30

## 2022-06-30 RX ORDER — OLOPATADINE HCL 1 MG/ML
0.1 SOLUTION/ DROPS OPHTHALMIC
Qty: 5 | Refills: 0 | Status: DISCONTINUED | COMMUNITY
Start: 2017-09-27 | End: 2022-06-30

## 2022-06-30 RX ORDER — NORGESTIMATE AND ETHINYL ESTRADIOL 7DAYSX3 28
0.18/0.215/0.25 KIT ORAL DAILY
Qty: 2 | Refills: 0 | Status: DISCONTINUED | COMMUNITY
Start: 2019-12-11 | End: 2022-06-30

## 2022-07-04 ENCOUNTER — TRANSCRIPTION ENCOUNTER (OUTPATIENT)
Age: 32
End: 2022-07-04

## 2022-07-05 ENCOUNTER — NON-APPOINTMENT (OUTPATIENT)
Age: 32
End: 2022-07-05

## 2022-07-11 ENCOUNTER — NON-APPOINTMENT (OUTPATIENT)
Age: 32
End: 2022-07-11

## 2022-07-18 ENCOUNTER — NON-APPOINTMENT (OUTPATIENT)
Age: 32
End: 2022-07-18

## 2022-08-11 ENCOUNTER — APPOINTMENT (OUTPATIENT)
Dept: CARDIOLOGY | Facility: CLINIC | Age: 32
End: 2022-08-11

## 2022-09-19 ENCOUNTER — APPOINTMENT (OUTPATIENT)
Dept: INTERNAL MEDICINE | Facility: CLINIC | Age: 32
End: 2022-09-19

## 2022-10-20 ENCOUNTER — APPOINTMENT (OUTPATIENT)
Dept: INTERNAL MEDICINE | Facility: CLINIC | Age: 32
End: 2022-10-20

## 2022-10-20 VITALS
OXYGEN SATURATION: 99 % | RESPIRATION RATE: 16 BRPM | SYSTOLIC BLOOD PRESSURE: 124 MMHG | HEART RATE: 78 BPM | WEIGHT: 138 LBS | BODY MASS INDEX: 21.66 KG/M2 | DIASTOLIC BLOOD PRESSURE: 82 MMHG | HEIGHT: 67 IN | TEMPERATURE: 98 F

## 2022-10-20 DIAGNOSIS — Z01.419 ENCOUNTER FOR GYNECOLOGICAL EXAMINATION (GENERAL) (ROUTINE) W/OUT ABNORMAL FINDINGS: ICD-10-CM

## 2022-10-20 DIAGNOSIS — O02.1 MISSED ABORTION: ICD-10-CM

## 2022-10-20 DIAGNOSIS — Z87.2 PERSONAL HISTORY OF DISEASES OF THE SKIN AND SUBCUTANEOUS TISSUE: ICD-10-CM

## 2022-10-20 DIAGNOSIS — Z00.00 ENCOUNTER FOR GENERAL ADULT MEDICAL EXAMINATION W/OUT ABNORMAL FINDINGS: ICD-10-CM

## 2022-10-20 DIAGNOSIS — N91.1 SECONDARY AMENORRHEA: ICD-10-CM

## 2022-10-20 DIAGNOSIS — Z78.9 OTHER SPECIFIED HEALTH STATUS: ICD-10-CM

## 2022-10-20 DIAGNOSIS — Z23 ENCOUNTER FOR IMMUNIZATION: ICD-10-CM

## 2022-10-20 DIAGNOSIS — B36.0 PITYRIASIS VERSICOLOR: ICD-10-CM

## 2022-10-20 DIAGNOSIS — Z30.019 ENCOUNTER FOR INITIAL PRESCRIPTION OF CONTRACEPTIVES, UNSPECIFIED: ICD-10-CM

## 2022-10-20 DIAGNOSIS — E55.9 VITAMIN D DEFICIENCY, UNSPECIFIED: ICD-10-CM

## 2022-10-20 PROCEDURE — G0008: CPT

## 2022-10-20 PROCEDURE — 36415 COLL VENOUS BLD VENIPUNCTURE: CPT

## 2022-10-20 PROCEDURE — 90686 IIV4 VACC NO PRSV 0.5 ML IM: CPT

## 2022-10-20 PROCEDURE — 99385 PREV VISIT NEW AGE 18-39: CPT | Mod: 25

## 2022-10-20 RX ORDER — IBUPROFEN 200 MG/1
200 TABLET ORAL
Refills: 0 | Status: DISCONTINUED | COMMUNITY
Start: 2022-06-30 | End: 2022-10-20

## 2022-10-20 RX ORDER — ACETAMINOPHEN 325 MG/1
325 TABLET ORAL
Refills: 0 | Status: DISCONTINUED | COMMUNITY
Start: 2022-06-30 | End: 2022-10-20

## 2022-10-20 RX ORDER — BLOOD PRESSURE TEST KIT-LARGE
KIT MISCELLANEOUS
Qty: 1 | Refills: 0 | Status: DISCONTINUED | COMMUNITY
Start: 2022-07-05 | End: 2022-10-20

## 2022-10-20 NOTE — HISTORY OF PRESENT ILLNESS
[de-identified] : 31 year F with no significant PMH presents for CPE. Requests flu shot. Pt denies CP/SOB, fever/chills, n/v/d/c.

## 2022-10-20 NOTE — HEALTH RISK ASSESSMENT
[Good] : ~his/her~  mood as  good [Never] : Never [No] : In the past 12 months have you used drugs other than those required for medical reasons? No [0] : 2) Feeling down, depressed, or hopeless: Not at all (0) [PHQ-2 Negative - No further assessment needed] : PHQ-2 Negative - No further assessment needed [Patient reported PAP Smear was normal] : Patient reported PAP Smear was normal [None] : None [With Family] : lives with family [Employed] : employed [Audit-CScore] : 0 [[id number]] : 0 [# Of Children ___] : has [unfilled] children [PapSmearDate] : 02/21 [FreeTextEntry2] : RN

## 2022-10-20 NOTE — PLAN
[FreeTextEntry1] : Routine blood work and urine today. Flu shot today. Pt advised to sign up for [facility] portal to review labs and communicate any questions or concerns directly. Yearly physical and return as needed for illness, medication refills, and new or existing complaints.

## 2022-10-24 ENCOUNTER — TRANSCRIPTION ENCOUNTER (OUTPATIENT)
Age: 32
End: 2022-10-24

## 2022-10-24 LAB
25(OH)D3 SERPL-MCNC: 32 NG/ML
ALBUMIN SERPL ELPH-MCNC: 4.9 G/DL
ALP BLD-CCNC: 67 U/L
ALT SERPL-CCNC: 13 U/L
ANION GAP SERPL CALC-SCNC: 19 MMOL/L
APPEARANCE: CLEAR
AST SERPL-CCNC: 21 U/L
BACTERIA: NEGATIVE
BASOPHILS # BLD AUTO: 0.06 K/UL
BASOPHILS NFR BLD AUTO: 1.9 %
BILIRUB SERPL-MCNC: 0.4 MG/DL
BILIRUBIN URINE: NEGATIVE
BLOOD URINE: NEGATIVE
BUN SERPL-MCNC: 7 MG/DL
CALCIUM SERPL-MCNC: 9.6 MG/DL
CHLORIDE SERPL-SCNC: 98 MMOL/L
CHOLEST SERPL-MCNC: 169 MG/DL
CO2 SERPL-SCNC: 21 MMOL/L
COLOR: NORMAL
CREAT SERPL-MCNC: 0.86 MG/DL
EGFR: 94 ML/MIN/1.73M2
EOSINOPHIL # BLD AUTO: 0.07 K/UL
EOSINOPHIL NFR BLD AUTO: 2.3 %
ESTIMATED AVERAGE GLUCOSE: 100 MG/DL
GLUCOSE QUALITATIVE U: NEGATIVE
GLUCOSE SERPL-MCNC: 77 MG/DL
HBA1C MFR BLD HPLC: 5.1 %
HCT VFR BLD CALC: 41.7 %
HDLC SERPL-MCNC: 73 MG/DL
HGB BLD-MCNC: 12.8 G/DL
HYALINE CASTS: 2 /LPF
IMM GRANULOCYTES NFR BLD AUTO: 0 %
KETONES URINE: NEGATIVE
LDLC SERPL CALC-MCNC: 89 MG/DL
LEUKOCYTE ESTERASE URINE: NEGATIVE
LYMPHOCYTES # BLD AUTO: 1.2 K/UL
LYMPHOCYTES NFR BLD AUTO: 38.7 %
MAN DIFF?: NORMAL
MCHC RBC-ENTMCNC: 25.6 PG
MCHC RBC-ENTMCNC: 30.7 GM/DL
MCV RBC AUTO: 83.4 FL
MICROSCOPIC-UA: NORMAL
MONOCYTES # BLD AUTO: 0.34 K/UL
MONOCYTES NFR BLD AUTO: 11 %
NEUTROPHILS # BLD AUTO: 1.43 K/UL
NEUTROPHILS NFR BLD AUTO: 46.1 %
NITRITE URINE: NEGATIVE
NONHDLC SERPL-MCNC: 96 MG/DL
PH URINE: 6.5
PLATELET # BLD AUTO: 183 K/UL
POTASSIUM SERPL-SCNC: 4.6 MMOL/L
PROT SERPL-MCNC: 6.8 G/DL
PROTEIN URINE: NEGATIVE
RBC # BLD: 5 M/UL
RBC # FLD: 15.3 %
RED BLOOD CELLS URINE: 0 /HPF
SODIUM SERPL-SCNC: 138 MMOL/L
SPECIFIC GRAVITY URINE: 1
SQUAMOUS EPITHELIAL CELLS: 2 /HPF
TRIGL SERPL-MCNC: 36 MG/DL
TSH SERPL-ACNC: 2.92 UIU/ML
UROBILINOGEN URINE: NORMAL
WBC # FLD AUTO: 3.1 K/UL
WHITE BLOOD CELLS URINE: 3 /HPF

## 2023-06-06 NOTE — OB PROVIDER H&P - NS_FHRLOC_OBGYN_ALL_OB
Below [facility] Medical Necessity Information: LCD Guidelines vary from payer to payer. Please check with your payer's policy to determine medical necessity.

## 2023-08-09 ENCOUNTER — APPOINTMENT (OUTPATIENT)
Dept: ANTEPARTUM | Facility: CLINIC | Age: 33
End: 2023-08-09
Payer: COMMERCIAL

## 2023-08-09 ENCOUNTER — ASOB RESULT (OUTPATIENT)
Age: 33
End: 2023-08-09

## 2023-08-09 PROCEDURE — 76801 OB US < 14 WKS SINGLE FETUS: CPT

## 2023-08-09 PROCEDURE — 76813 OB US NUCHAL MEAS 1 GEST: CPT | Mod: 59

## 2023-09-21 ENCOUNTER — ASOB RESULT (OUTPATIENT)
Age: 33
End: 2023-09-21

## 2023-09-21 ENCOUNTER — APPOINTMENT (OUTPATIENT)
Dept: ANTEPARTUM | Facility: CLINIC | Age: 33
End: 2023-09-21
Payer: COMMERCIAL

## 2023-09-21 PROCEDURE — 76811 OB US DETAILED SNGL FETUS: CPT

## 2023-11-13 ENCOUNTER — NON-APPOINTMENT (OUTPATIENT)
Age: 33
End: 2023-11-13

## 2024-02-05 ENCOUNTER — OUTPATIENT (OUTPATIENT)
Dept: INPATIENT UNIT | Facility: HOSPITAL | Age: 34
LOS: 1 days | Discharge: HOME | End: 2024-02-05
Payer: COMMERCIAL

## 2024-02-05 VITALS — DIASTOLIC BLOOD PRESSURE: 80 MMHG | SYSTOLIC BLOOD PRESSURE: 117 MMHG | HEART RATE: 71 BPM

## 2024-02-05 VITALS
DIASTOLIC BLOOD PRESSURE: 90 MMHG | SYSTOLIC BLOOD PRESSURE: 131 MMHG | TEMPERATURE: 98 F | HEART RATE: 74 BPM | RESPIRATION RATE: 14 BRPM

## 2024-02-05 DIAGNOSIS — Z98.891 HISTORY OF UTERINE SCAR FROM PREVIOUS SURGERY: Chronic | ICD-10-CM

## 2024-02-05 DIAGNOSIS — Z90.89 ACQUIRED ABSENCE OF OTHER ORGANS: Chronic | ICD-10-CM

## 2024-02-05 DIAGNOSIS — O26.899 OTHER SPECIFIED PREGNANCY RELATED CONDITIONS, UNSPECIFIED TRIMESTER: ICD-10-CM

## 2024-02-05 DIAGNOSIS — Z98.890 OTHER SPECIFIED POSTPROCEDURAL STATES: Chronic | ICD-10-CM

## 2024-02-05 PROCEDURE — 99221 1ST HOSP IP/OBS SF/LOW 40: CPT | Mod: 25

## 2024-02-05 PROCEDURE — 59025 FETAL NON-STRESS TEST: CPT | Mod: 26

## 2024-02-05 NOTE — OB RN TRIAGE NOTE - FALL HARM RISK - UNIVERSAL INTERVENTIONS
Bed in lowest position, wheels locked, appropriate side rails in place/Call bell, personal items and telephone in reach/Instruct patient to call for assistance before getting out of bed or chair/Non-slip footwear when patient is out of bed/Elaine to call system/Physically safe environment - no spills, clutter or unnecessary equipment/Purposeful Proactive Rounding/Room/bathroom lighting operational, light cord in reach

## 2024-02-05 NOTE — OB PROVIDER TRIAGE NOTE - NSHPPHYSICALEXAM_GEN_ALL_CORE
Vital Signs Last 24 Hrs  T(C): 36.8 (05 Feb 2024 17:48), Max: 36.8 (05 Feb 2024 16:53)  T(F): 98.24 (05 Feb 2024 17:48), Max: 98.24 (05 Feb 2024 17:48)  HR: 68 (05 Feb 2024 17:50) (68 - 74)  BP: 122/84 (05 Feb 2024 17:50) (122/84 - 131/90)  BP(mean): --  RR: 14 (05 Feb 2024 16:53) (14 - 14)  SpO2: --    A&O x3  CTAB  abdomen: gravid, soft, nontender  NST initiated  TAS: Vital Signs Last 24 Hrs  T(C): 36.8 (05 Feb 2024 17:48), Max: 36.8 (05 Feb 2024 16:53)  T(F): 98.24 (05 Feb 2024 17:48), Max: 98.24 (05 Feb 2024 17:48)  HR: 68 (05 Feb 2024 17:50) (68 - 74)  BP: 122/84 (05 Feb 2024 17:50) (122/84 - 131/90)  BP(mean): --  RR: 14 (05 Feb 2024 16:53) (14 - 14)  SpO2: --    A&O x3  CTAB  abdomen: gravid, soft, nontender   baseline, moderate variability + accels, no decels, reactive NST  TAS: vtx, post placenta, bpp 8/8, m mode 122 bpm, cristin 22.05. images saved to [facility]   TAS:

## 2024-02-05 NOTE — OB PROVIDER TRIAGE NOTE - NSOBPROVIDERNOTE_OBGYN_ALL_OB_FT
NST in progress NST in progress    Maternal/ fetal surveillance reassuring  Plan discussed with Dr Laird  fetal kick counts, reviewed with patient  labor precuations reviewed  continue fetal kick counts, rest and activity as tolerated, increase PO fluid  follow up with OBGYN as scheduled  pt verbalized understanding of instructions given  discharged home at 1940

## 2024-02-05 NOTE — OB RN TRIAGE NOTE - NSICDXPASTSURGICALHX_GEN_ALL_CORE_FT
PAST SURGICAL HISTORY:  H/O knee surgery left    History of tonsillectomy     Previous  section

## 2024-02-05 NOTE — OB PROVIDER TRIAGE NOTE - ADDITIONAL INSTRUCTIONS
Maternal/ fetal surveillance reassuring  Plan discussed with Dr Laird  fetal kick counts, reviewed with patient  labor precuations reviewed  continue fetal kick counts, rest and activity as tolerated, increase PO fluid  follow up with OBGYN as scheduled  pt verbalized understanding of instructions given  discharged home at 1940

## 2024-02-05 NOTE — OB PROVIDER TRIAGE NOTE - HISTORY OF PRESENT ILLNESS
This us a 33 year old  at 37.5 weeks gestational age presents from [facility] with possible decel on sonogram today in office. Pt is followed weekly for NST/BPP due to polyhydramnios. NST was faxed to triage for review, which looks reactive. PT reports +GFM, denies LOF, VB or contractions.     AP course:  - polyhydramnios, AFUA today 26  - anemia, iron infusion x 4, last infusion , pt was scheduled for infusion today but missed appointment  - scheduled for rpt c/s on    This us a 33 year old  at 37.5 weeks gestational age presents from [facility] with possible decel on sonogram today in office. Pt is followed weekly for NST/BPP due to polyhydramnios. NST was faxed to triage for review, which is reactive and cat 1. PT reports +GFM, denies LOF, VB or contractions.     As per Dr Christensen, he reviewed the tracing from office which was Cat 1, pt was instructed to be evaluated in D&T by Dr Razo.    AP course:  - polyhydramnios, AFUA today 26 as per patient  - anemia, iron infusion x 4, last infusion , pt was scheduled for infusion today but missed appointment  - scheduled for rpt c/s on

## 2024-02-05 NOTE — OB RN TRIAGE NOTE - FALL HARM RISK - ATTEMPT OOB
Prescription approved per [facility] Refill Protocol.    Debbie SONGN, RN, PHN       No

## 2024-02-07 DIAGNOSIS — O09.293 SUPERVISION OF PREGNANCY WITH OTHER POOR REPRODUCTIVE OR OBSTETRIC HISTORY, THIRD TRIMESTER: ICD-10-CM

## 2024-02-07 DIAGNOSIS — D64.9 ANEMIA, UNSPECIFIED: ICD-10-CM

## 2024-02-07 DIAGNOSIS — Z3A.37 37 WEEKS GESTATION OF PREGNANCY: ICD-10-CM

## 2024-02-07 DIAGNOSIS — O40.3XX0 POLYHYDRAMNIOS, THIRD TRIMESTER, NOT APPLICABLE OR UNSPECIFIED: ICD-10-CM

## 2024-02-07 DIAGNOSIS — O34.219 MATERNAL CARE FOR UNSPECIFIED TYPE SCAR FROM PREVIOUS CESAREAN DELIVERY: ICD-10-CM

## 2024-02-07 DIAGNOSIS — O99.013 ANEMIA COMPLICATING PREGNANCY, THIRD TRIMESTER: ICD-10-CM

## 2024-02-12 ENCOUNTER — OUTPATIENT (OUTPATIENT)
Dept: OUTPATIENT SERVICES | Facility: HOSPITAL | Age: 34
LOS: 1 days | End: 2024-02-12

## 2024-02-12 VITALS
DIASTOLIC BLOOD PRESSURE: 79 MMHG | OXYGEN SATURATION: 98 % | RESPIRATION RATE: 16 BRPM | SYSTOLIC BLOOD PRESSURE: 116 MMHG | HEART RATE: 83 BPM | TEMPERATURE: 98 F | HEIGHT: 67 IN | WEIGHT: 160.06 LBS

## 2024-02-12 DIAGNOSIS — Z90.89 ACQUIRED ABSENCE OF OTHER ORGANS: Chronic | ICD-10-CM

## 2024-02-12 DIAGNOSIS — Z98.890 OTHER SPECIFIED POSTPROCEDURAL STATES: Chronic | ICD-10-CM

## 2024-02-12 DIAGNOSIS — Z98.891 HISTORY OF UTERINE SCAR FROM PREVIOUS SURGERY: Chronic | ICD-10-CM

## 2024-02-12 LAB
APPEARANCE UR: CLEAR — SIGNIFICANT CHANGE UP
BACTERIA # UR AUTO: ABNORMAL /HPF
BILIRUB UR-MCNC: NEGATIVE — SIGNIFICANT CHANGE UP
BLD GP AB SCN SERPL QL: NEGATIVE — SIGNIFICANT CHANGE UP
CAST: 0 /LPF — SIGNIFICANT CHANGE UP (ref 0–4)
COLOR SPEC: YELLOW — SIGNIFICANT CHANGE UP
DIFF PNL FLD: NEGATIVE — SIGNIFICANT CHANGE UP
GLUCOSE UR QL: NEGATIVE MG/DL — SIGNIFICANT CHANGE UP
HCT VFR BLD CALC: 37.6 % — SIGNIFICANT CHANGE UP (ref 34.5–45)
HGB BLD-MCNC: 12.5 G/DL — SIGNIFICANT CHANGE UP (ref 11.5–15.5)
KETONES UR-MCNC: NEGATIVE MG/DL — SIGNIFICANT CHANGE UP
LEUKOCYTE ESTERASE UR-ACNC: ABNORMAL
MCHC RBC-ENTMCNC: 28.2 PG — SIGNIFICANT CHANGE UP (ref 27–34)
MCHC RBC-ENTMCNC: 33.2 GM/DL — SIGNIFICANT CHANGE UP (ref 32–36)
MCV RBC AUTO: 84.9 FL — SIGNIFICANT CHANGE UP (ref 80–100)
NITRITE UR-MCNC: NEGATIVE — SIGNIFICANT CHANGE UP
NRBC # BLD: 0 /100 WBCS — SIGNIFICANT CHANGE UP (ref 0–0)
NRBC # FLD: 0 K/UL — SIGNIFICANT CHANGE UP (ref 0–0)
PH UR: 6.5 — SIGNIFICANT CHANGE UP (ref 5–8)
PLATELET # BLD AUTO: 167 K/UL — SIGNIFICANT CHANGE UP (ref 150–400)
PROT UR-MCNC: NEGATIVE MG/DL — SIGNIFICANT CHANGE UP
RBC # BLD: 4.43 M/UL — SIGNIFICANT CHANGE UP (ref 3.8–5.2)
RBC # FLD: 17 % — HIGH (ref 10.3–14.5)
RBC CASTS # UR COMP ASSIST: 1 /HPF — SIGNIFICANT CHANGE UP (ref 0–4)
REVIEW: SIGNIFICANT CHANGE UP
RH IG SCN BLD-IMP: POSITIVE — SIGNIFICANT CHANGE UP
SP GR SPEC: 1.01 — SIGNIFICANT CHANGE UP (ref 1–1.03)
SQUAMOUS # UR AUTO: 5 /HPF — SIGNIFICANT CHANGE UP (ref 0–5)
UROBILINOGEN FLD QL: 0.2 MG/DL — SIGNIFICANT CHANGE UP (ref 0.2–1)
WBC # BLD: 7.66 K/UL — SIGNIFICANT CHANGE UP (ref 3.8–10.5)
WBC # FLD AUTO: 7.66 K/UL — SIGNIFICANT CHANGE UP (ref 3.8–10.5)
WBC UR QL: 6 /HPF — HIGH (ref 0–5)

## 2024-02-12 NOTE — OB PST NOTE - WEIGHT IN KG
Vital Signs Last 24 Hrs  T(C): 39.3, Max: 39.3 (06-26 @ 16:22)  T(F): 102.8, Max: 102.8 (06-26 @ 16:22)  HR: 98 (98 - 113)  BP: 118/68 (118/68 - 119/80)  BP(mean): --  RR: 16 (16 - 19)  SpO2: 100% (100% - 100%)    GEN: appears comfortable  Neuro: AAOx3, nonfocal  HEENT: NC/AT, EOMI  Neck: no thyroidmegaly, no JVD  Cardiovascular: S1S2 present, regular rhythm, no murmur, tachycardia  Respiratory: breath sounds normal bilaterally, no wheezing, no rales, no rhonchi  Gastrointestinal: bowel sounds normal, soft, no abdominal tenderness  Musculoskeletal: no muscle tenderness  Extremities: No edema  Skin: No rash, no insect bites 72.6

## 2024-02-12 NOTE — OB PST NOTE - BIRTH SEX
Hi  You are not immune to chickenpox.  As it is a live vaccine and requires 2 doses I recommend that you get the vaccine (2 parts) and wait 1 month after the second dose to try to conceive. We don't administer it when you are pregnant. You will have to see to see your Primary care provider for the vaccine.  IF you needs one please let us know.  Have a great day.    Mason Ordaz MD   Female

## 2024-02-12 NOTE — OB PST NOTE - FALL HARM RISK - UNIVERSAL INTERVENTIONS
Bed in lowest position, wheels locked, appropriate side rails in place/Call bell, personal items and telephone in reach/Instruct patient to call for assistance before getting out of bed or chair/Non-slip footwear when patient is out of bed/[place] to call system/Physically safe environment - no spills, clutter or unnecessary equipment/Purposeful Proactive Rounding/Room/bathroom lighting operational, light cord in reach

## 2024-02-12 NOTE — OB PST NOTE - NSHPREVIEWOFSYSTEMS_GEN_ALL_CORE
General: Denies fever, chills, sweating, anorexia, weight loss  No polyphagia, polyuria, polydypsia, malaise, or fatigue    Skin: Denies rashes, itching, or dryness. No change in size/color of moles. No tumors, brittle nails, pitted nails, or hair loss    Breast: Denies tenderness, lumps, or nipple discharge      Ophthalmologic: Denies diplopia, photophobia, lacrimation, blurred Vision , or discharge    ENMT Symptoms: Denies hearing difficulty, ear pain, tinnitus, or vertigo, sinus symptoms, nasal discharge, or nasal obstruction    Respiratory and Thorax: Denies wheezing, dyspnea, cough, hemoptysis, or pleuritic chest pain     Cardiovascular: Denies chest pain, palpitations, dyspnea on exertion, orthopnea, paroxysmal nocturnal dyspnea, peripheral edema, or claudication    Gastrointestinal: Denies nausea, vomiting, diarrhea, constipation, change in bowel habits, flatulence, abdominal pain, or melena    Genitourinary/ Pelvis: Denies hematuria, renal colic, or flank pain, urine discoloration, incontinence, dysuria, or urinary hesitancy. Normal urinary frequency. Denies nocturia, Denies abnormal vaginal bleeding, discharge, spotting, pelvic pain, or amniotic fluid leakage    Musculoskeletal: Denies arthralgia, arthritis, joint swelling, muscle cramping, muscle weakness.    Neurological: Denies transient paralysis, weakness, paresthesias, or seizures. Denies syncope, tremors, vertigo, loss of sensation, difficulty walking, loss of consciousness, hemiparesis, confusion, or facial palsy    Psychiatric: Denies history of post- partum depression, suicidal ideation, depression, anxiety, insomnia, memory loss, paranoia, mood swings, agitation, hallucinations, or hyperactivity    Hematology: Denies gum bleeding, nose bleeding, or skin lumps    Lymphatic: Denies enlarged or tender lymph nodes. No extremity swelling    Endocrine: Denies heat or cold intolerance, or abnormal glucose reading     Immunologic: Denies recurrent or persistent infections

## 2024-02-12 NOTE — OB PST NOTE - PROBLEM SELECTOR PLAN 1
Patient scheduled for surgery on: 2/16/24  Provided with verbal and written presurgical instructions  Verbalized understanding  with teach back on the following: chlorhexidine wash    Lab specimen drawn at PST today: cbc, ua, type

## 2024-02-12 NOTE — OB PST NOTE - HISTORY OF PRESENT ILLNESS
33  year old pregnant patient presents at  38 weeks 5 days gestation   TONIO: 24 confirmed with sono  Reports normal fetal movement.   G 3 P  1  Denies pelvic pain, sustained back pain, vaginal bleeding, or leakage of amniotic fluid.    Patient presents for PST evaluation for planned  section with history of prior

## 2024-02-12 NOTE — OB PST NOTE - NSANTHOSAYNRD_GEN_A_CORE
No. YENIFER screening performed.  STOP BANG Legend: 0-2 = LOW Risk; 3-4 = INTERMEDIATE Risk; 5-8 = HIGH Risk

## 2024-02-16 ENCOUNTER — INPATIENT (INPATIENT)
Facility: HOSPITAL | Age: 34
LOS: 2 days | Discharge: HOME | End: 2024-02-19
Attending: OBSTETRICS & GYNECOLOGY | Admitting: OBSTETRICS & GYNECOLOGY

## 2024-02-16 ENCOUNTER — TRANSCRIPTION ENCOUNTER (OUTPATIENT)
Age: 34
End: 2024-02-16

## 2024-02-16 VITALS
DIASTOLIC BLOOD PRESSURE: 88 MMHG | HEIGHT: 67 IN | SYSTOLIC BLOOD PRESSURE: 123 MMHG | HEART RATE: 71 BPM | WEIGHT: 160.06 LBS | RESPIRATION RATE: 14 BRPM | TEMPERATURE: 98 F

## 2024-02-16 DIAGNOSIS — Z98.890 OTHER SPECIFIED POSTPROCEDURAL STATES: Chronic | ICD-10-CM

## 2024-02-16 DIAGNOSIS — Z90.89 ACQUIRED ABSENCE OF OTHER ORGANS: Chronic | ICD-10-CM

## 2024-02-16 DIAGNOSIS — Z98.891 HISTORY OF UTERINE SCAR FROM PREVIOUS SURGERY: Chronic | ICD-10-CM

## 2024-02-16 LAB
BASOPHILS # BLD AUTO: 0.03 K/UL — SIGNIFICANT CHANGE UP (ref 0–0.2)
BASOPHILS NFR BLD AUTO: 0.4 % — SIGNIFICANT CHANGE UP (ref 0–2)
BLD GP AB SCN SERPL QL: NEGATIVE — SIGNIFICANT CHANGE UP
EOSINOPHIL # BLD AUTO: 0.01 K/UL — SIGNIFICANT CHANGE UP (ref 0–0.5)
EOSINOPHIL NFR BLD AUTO: 0.1 % — SIGNIFICANT CHANGE UP (ref 0–6)
HCT VFR BLD CALC: 36.9 % — SIGNIFICANT CHANGE UP (ref 34.5–45)
HCT VFR BLD CALC: 39.3 % — SIGNIFICANT CHANGE UP (ref 34.5–45)
HGB BLD-MCNC: 12.4 G/DL — SIGNIFICANT CHANGE UP (ref 11.5–15.5)
HGB BLD-MCNC: 13.4 G/DL — SIGNIFICANT CHANGE UP (ref 11.5–15.5)
IANC: 5.31 K/UL — SIGNIFICANT CHANGE UP (ref 1.8–7.4)
IMM GRANULOCYTES NFR BLD AUTO: 0.4 % — SIGNIFICANT CHANGE UP (ref 0–0.9)
LYMPHOCYTES # BLD AUTO: 1.31 K/UL — SIGNIFICANT CHANGE UP (ref 1–3.3)
LYMPHOCYTES # BLD AUTO: 18.6 % — SIGNIFICANT CHANGE UP (ref 13–44)
MCHC RBC-ENTMCNC: 28.6 PG — SIGNIFICANT CHANGE UP (ref 27–34)
MCHC RBC-ENTMCNC: 28.8 PG — SIGNIFICANT CHANGE UP (ref 27–34)
MCHC RBC-ENTMCNC: 33.6 GM/DL — SIGNIFICANT CHANGE UP (ref 32–36)
MCHC RBC-ENTMCNC: 34.1 GM/DL — SIGNIFICANT CHANGE UP (ref 32–36)
MCV RBC AUTO: 84.5 FL — SIGNIFICANT CHANGE UP (ref 80–100)
MCV RBC AUTO: 85 FL — SIGNIFICANT CHANGE UP (ref 80–100)
MONOCYTES # BLD AUTO: 0.37 K/UL — SIGNIFICANT CHANGE UP (ref 0–0.9)
MONOCYTES NFR BLD AUTO: 5.2 % — SIGNIFICANT CHANGE UP (ref 2–14)
NEUTROPHILS # BLD AUTO: 5.31 K/UL — SIGNIFICANT CHANGE UP (ref 1.8–7.4)
NEUTROPHILS NFR BLD AUTO: 75.3 % — SIGNIFICANT CHANGE UP (ref 43–77)
NRBC # BLD: 0 /100 WBCS — SIGNIFICANT CHANGE UP (ref 0–0)
NRBC # BLD: 0 /100 WBCS — SIGNIFICANT CHANGE UP (ref 0–0)
NRBC # FLD: 0 K/UL — SIGNIFICANT CHANGE UP (ref 0–0)
NRBC # FLD: 0 K/UL — SIGNIFICANT CHANGE UP (ref 0–0)
PLATELET # BLD AUTO: 143 K/UL — LOW (ref 150–400)
PLATELET # BLD AUTO: 159 K/UL — SIGNIFICANT CHANGE UP (ref 150–400)
RBC # BLD: 4.34 M/UL — SIGNIFICANT CHANGE UP (ref 3.8–5.2)
RBC # BLD: 4.65 M/UL — SIGNIFICANT CHANGE UP (ref 3.8–5.2)
RBC # FLD: 16.6 % — HIGH (ref 10.3–14.5)
RBC # FLD: 16.9 % — HIGH (ref 10.3–14.5)
RH IG SCN BLD-IMP: POSITIVE — SIGNIFICANT CHANGE UP
T PALLIDUM AB TITR SER: NEGATIVE — SIGNIFICANT CHANGE UP
WBC # BLD: 13.98 K/UL — HIGH (ref 3.8–10.5)
WBC # BLD: 7.06 K/UL — SIGNIFICANT CHANGE UP (ref 3.8–10.5)
WBC # FLD AUTO: 13.98 K/UL — HIGH (ref 3.8–10.5)
WBC # FLD AUTO: 7.06 K/UL — SIGNIFICANT CHANGE UP (ref 3.8–10.5)

## 2024-02-16 DEVICE — SURGICEL SNOW 2 X 4": Type: IMPLANTABLE DEVICE | Status: FUNCTIONAL

## 2024-02-16 RX ORDER — SIMETHICONE 80 MG/1
80 TABLET, CHEWABLE ORAL EVERY 4 HOURS
Refills: 0 | Status: DISCONTINUED | OUTPATIENT
Start: 2024-02-16 | End: 2024-02-19

## 2024-02-16 RX ORDER — OXYCODONE HYDROCHLORIDE 5 MG/1
5 TABLET ORAL
Refills: 0 | Status: DISCONTINUED | OUTPATIENT
Start: 2024-02-16 | End: 2024-02-19

## 2024-02-16 RX ORDER — SODIUM CHLORIDE 9 MG/ML
1000 INJECTION, SOLUTION INTRAVENOUS
Refills: 0 | Status: DISCONTINUED | OUTPATIENT
Start: 2024-02-16 | End: 2024-02-17

## 2024-02-16 RX ORDER — MAGNESIUM HYDROXIDE 400 MG/1
30 TABLET, CHEWABLE ORAL
Refills: 0 | Status: DISCONTINUED | OUTPATIENT
Start: 2024-02-16 | End: 2024-02-19

## 2024-02-16 RX ORDER — FAMOTIDINE 10 MG/ML
20 INJECTION INTRAVENOUS ONCE
Refills: 0 | Status: COMPLETED | OUTPATIENT
Start: 2024-02-16 | End: 2024-02-16

## 2024-02-16 RX ORDER — CITRIC ACID/SODIUM CITRATE 300-500 MG
30 SOLUTION, ORAL ORAL ONCE
Refills: 0 | Status: COMPLETED | OUTPATIENT
Start: 2024-02-16 | End: 2024-02-16

## 2024-02-16 RX ORDER — ACETAMINOPHEN 500 MG
1000 TABLET ORAL ONCE
Refills: 0 | Status: COMPLETED | OUTPATIENT
Start: 2024-02-16 | End: 2024-02-16

## 2024-02-16 RX ORDER — ACETAMINOPHEN 500 MG
975 TABLET ORAL
Refills: 0 | Status: DISCONTINUED | OUTPATIENT
Start: 2024-02-16 | End: 2024-02-18

## 2024-02-16 RX ORDER — IBUPROFEN 200 MG
1 TABLET ORAL
Qty: 0 | Refills: 0 | DISCHARGE
Start: 2024-02-16

## 2024-02-16 RX ORDER — ASPIRIN/CALCIUM CARB/MAGNESIUM 324 MG
1 TABLET ORAL
Refills: 0 | DISCHARGE

## 2024-02-16 RX ORDER — HEPARIN SODIUM 5000 [USP'U]/ML
5000 INJECTION INTRAVENOUS; SUBCUTANEOUS EVERY 12 HOURS
Refills: 0 | Status: DISCONTINUED | OUTPATIENT
Start: 2024-02-16 | End: 2024-02-19

## 2024-02-16 RX ORDER — SODIUM CHLORIDE 9 MG/ML
500 INJECTION, SOLUTION INTRAVENOUS ONCE
Refills: 0 | Status: DISCONTINUED | OUTPATIENT
Start: 2024-02-16 | End: 2024-02-17

## 2024-02-16 RX ORDER — DIPHENHYDRAMINE HCL 50 MG
25 CAPSULE ORAL EVERY 6 HOURS
Refills: 0 | Status: DISCONTINUED | OUTPATIENT
Start: 2024-02-16 | End: 2024-02-19

## 2024-02-16 RX ORDER — KETOROLAC TROMETHAMINE 30 MG/ML
30 SYRINGE (ML) INJECTION EVERY 6 HOURS
Refills: 0 | Status: DISCONTINUED | OUTPATIENT
Start: 2024-02-16 | End: 2024-02-17

## 2024-02-16 RX ORDER — IBUPROFEN 200 MG
600 TABLET ORAL EVERY 6 HOURS
Refills: 0 | Status: COMPLETED | OUTPATIENT
Start: 2024-02-16 | End: 2025-01-14

## 2024-02-16 RX ORDER — OXYTOCIN 10 UNIT/ML
333.33 VIAL (ML) INJECTION
Qty: 20 | Refills: 0 | Status: DISCONTINUED | OUTPATIENT
Start: 2024-02-16 | End: 2024-02-17

## 2024-02-16 RX ORDER — SODIUM CHLORIDE 9 MG/ML
1000 INJECTION, SOLUTION INTRAVENOUS ONCE
Refills: 0 | Status: COMPLETED | OUTPATIENT
Start: 2024-02-16 | End: 2024-02-16

## 2024-02-16 RX ORDER — SODIUM CHLORIDE 9 MG/ML
1000 INJECTION, SOLUTION INTRAVENOUS
Refills: 0 | Status: DISCONTINUED | OUTPATIENT
Start: 2024-02-16 | End: 2024-02-16

## 2024-02-16 RX ORDER — ACETAMINOPHEN 500 MG
3 TABLET ORAL
Qty: 0 | Refills: 0 | DISCHARGE
Start: 2024-02-16

## 2024-02-16 RX ORDER — OXYCODONE HYDROCHLORIDE 5 MG/1
5 TABLET ORAL ONCE
Refills: 0 | Status: DISCONTINUED | OUTPATIENT
Start: 2024-02-16 | End: 2024-02-19

## 2024-02-16 RX ORDER — TETANUS TOXOID, REDUCED DIPHTHERIA TOXOID AND ACELLULAR PERTUSSIS VACCINE, ADSORBED 5; 2.5; 8; 8; 2.5 [IU]/.5ML; [IU]/.5ML; UG/.5ML; UG/.5ML; UG/.5ML
0.5 SUSPENSION INTRAMUSCULAR ONCE
Refills: 0 | Status: DISCONTINUED | OUTPATIENT
Start: 2024-02-16 | End: 2024-02-19

## 2024-02-16 RX ORDER — LANOLIN
1 OINTMENT (GRAM) TOPICAL EVERY 6 HOURS
Refills: 0 | Status: DISCONTINUED | OUTPATIENT
Start: 2024-02-16 | End: 2024-02-19

## 2024-02-16 RX ORDER — CHLORHEXIDINE GLUCONATE 213 G/1000ML
1 SOLUTION TOPICAL DAILY
Refills: 0 | Status: DISCONTINUED | OUTPATIENT
Start: 2024-02-16 | End: 2024-02-16

## 2024-02-16 RX ORDER — LANOLIN
1 OINTMENT (GRAM) TOPICAL
Qty: 0 | Refills: 0 | DISCHARGE
Start: 2024-02-16

## 2024-02-16 RX ADMIN — Medication 30 MILLILITER(S): at 12:22

## 2024-02-16 RX ADMIN — CHLORHEXIDINE GLUCONATE 1 APPLICATION(S): 213 SOLUTION TOPICAL at 12:22

## 2024-02-16 RX ADMIN — Medication 30 MILLIGRAM(S): at 20:30

## 2024-02-16 RX ADMIN — HEPARIN SODIUM 5000 UNIT(S): 5000 INJECTION INTRAVENOUS; SUBCUTANEOUS at 23:55

## 2024-02-16 RX ADMIN — Medication 975 MILLIGRAM(S): at 23:58

## 2024-02-16 RX ADMIN — Medication 1000 MILLIGRAM(S): at 18:40

## 2024-02-16 RX ADMIN — Medication 30 MILLIGRAM(S): at 19:51

## 2024-02-16 RX ADMIN — Medication 400 MILLIGRAM(S): at 18:02

## 2024-02-16 RX ADMIN — SODIUM CHLORIDE 75 MILLILITER(S): 9 INJECTION, SOLUTION INTRAVENOUS at 19:02

## 2024-02-16 RX ADMIN — Medication 1000 MILLIUNIT(S)/MIN: at 19:02

## 2024-02-16 RX ADMIN — SODIUM CHLORIDE 2000 MILLILITER(S): 9 INJECTION, SOLUTION INTRAVENOUS at 12:22

## 2024-02-16 RX ADMIN — FAMOTIDINE 20 MILLIGRAM(S): 10 INJECTION INTRAVENOUS at 12:22

## 2024-02-16 NOTE — DISCHARGE NOTE OB - MEDICATION SUMMARY - MEDICATIONS TO TAKE
SSIST: Orestes    H&P TO BE COMPLETED BY:   Surgeon  FOR H&P TO BE DONE BY SURGEON   UPDATE VISIT ON DATE AT HOSPITAL  SAME DAY/OBSERVATION/INPATIENT: SAME DAY    EQUIPMENT: NONE  VENDOR NEEDED AT CASE: NONE  LABS/SPECIAL INSTRUCTIONS: NONE  TIME OFF WORK: 1WK           I will START or STAY ON the medications listed below when I get home from the hospital:    ibuprofen 600 mg oral tablet  -- 1 tab(s) by mouth every 6 hours  -- Indication: For Postpartum pain    acetaminophen 325 mg oral tablet  -- 3 tab(s) by mouth every 6 hours  -- Indication: For Postpartum pain    lanolin topical ointment  -- 1 Apply on skin to affected area every 6 hours As needed Sore Nipples  -- Indication: For Postpartum pain    PNV Prenatal Plus oral tablet  -- 1 tab(s) orally  -- Indication: For Postpartum

## 2024-02-16 NOTE — DISCHARGE NOTE OB - NS MD DC FALL RISK RISK
For information on Fall & Injury Prevention, visit: [email or web address] OR  [email or web address] OR  https://www.cdc.gov/steadi/patient.html

## 2024-02-16 NOTE — DISCHARGE NOTE OB - CARE PROVIDER_API CALL
Lexy Johnson  Obstetrics and Gynecology  [address] [place] [id number]  Phone: [phone]  Fax: [phone]  Follow Up Time:

## 2024-02-16 NOTE — OB RN INTRAOPERATIVE NOTE - NS_ELECTROCAUTCUT_OBGYN_ALL_OB_FT
40 Please refer to attached ultrasound report for doctor's evaluation of the clinical information obtained by vital signs, ultrasound, and/or non-stress test along with management recommendation.

## 2024-02-16 NOTE — OB PROVIDER H&P - ASSESSMENT
P1 @ 39+2 presenting for a scheduled repeat CS  admit to L&D  routine labs  IV fluids  preop meds  anesthesia consult  jef mcgregor

## 2024-02-16 NOTE — OB RN PATIENT PROFILE - POST PARTUM DEPRESSION SCREEN OB 4
Patient: SUZY CHRISTOPHER     Acct: [id number]     Report: #[id number]  UNIT #: [id number]     : 1977    Encounter Date:2020  PRIMARY CARE: CLOVER SILVA  ***Signed***  --------------------------------------------------------------------------------------------------------------------  Date of Encounter      Mar 5, 2020            Chief Complaint      DIABETES MELLITUS TYPE I            Referring Providers/Copies To      Referring Provider:  CLOVER SILVA            Allergies      Coded Allergies:             CODEINE (Verified  Allergy, Unknown, itching, 19)           PENICILLINS (Verified  Allergy, Unknown, yeast infection, 19)            Medications      Last Reconciled on 20 3:59 pm by SUZANNE BECKER      Simvastatin (Simvastatin*) 10 Mg Tablet      10 MG PO HS, #30 TAB 0 Refills         Reported         20       Lisinopril* (Lisinopril*) 2.5 Mg Tablet      2.5 PO QDAY, #30 TAB 0 Refills         Reported         17       Cetirizine Hcl (zyrTEC) 10 Mg Tablet      10 MG PO QDAY PRN for ALLERGIES, #30 TAB 0 Refills         Reported         10/12/17       Insulin Lispro (HumaLOG KWIKPEN 100 UNITS/ML) 100 Units/Ml Insuln.pen      30 UNITS SUBQ TID MEALS, #1 BOX 0 Refills         Reported         10/12/17       Insulin Glargine (Lantus SOLOSTAR) 100 Unit/1 Ml Insuln.pen      40 UNITS SUBQ HS, #1 BOX 0 Refills         Reported         10/12/17            Vital Signs      Height 6 ft 1 in / 185.42 cm      Weight 215 lbs  / 97.678418 kg      BSA 2.22 m2      BMI 28.4 kg/m2      Blood Glucose Value:  155            Preventative      Hx Influenza Vaccination:  Yes ()      Influenza Vaccine Declined:  No      Have You Had 2 or More Falls i:  No      Have You Had a Fall with an In:  No      Hx Pneumococcal Vaccination:  Yes (?)      Encouraged to follow-up with:  PCP regarding preventative exams.            HPI - Diabetes      This patient presents to the office today for  evaluation for diabetes medication    management.  He is a 42-year-old male patient with a 20-year history of type 1     diabetes.  The patient was previously managed by Dr. Cervantes, and endocrinologist     here in town who has recently retired.  He is asking to be seen in our office     today for routine follow-up.  This patient is currently managed on multiple     daily injections including Lantus 40 units at bedtime and Humalog with a 1-10     carbohydrate ratio and a 1-20 correction for glucose levels greater than 120     mg/dL.  The patient's most recent A1c was 7.7% and he states that he is noticed     that it has progressively increased over the last year.  The patient reports     that he has been hospitalized previously for DKA early on in his diagnosis and     he is also been seen in the emergency department for episodes of hypoglycemia in    the past.            Most Recent Lab Findings      Most Recent HGA1C      The most recent A1c was collected on December 3, 2019 was 7.7%.  This is an     increase from the previous result of 7.2%.            Item Value  Date Time             Hemoglobin A1c 7.7 % H 12/3/19 1205             Hemoglobin A1c 7.2 % H 8/26/19 1330            Diabetes Type:  Type 1      Diabetes Complications:  None      Number of Years?:  20      Hospitalizations 2nd to DM?:  Yes (DKA several years ago)      ER/911 Secondary to DM:  Yes (Due to hypoglycemic episodes in the past)      Dietary Habits:  3 Meals daily, Snacks, Carb Count (He counts his carbs to     adjust for insulin dose), Diet Drinks      Exercise:  None (He states he used to exercise regularly but he has not been     doing so since his child was born 5 months ago)      BG Monitoring:  Meter      Frequency:  Times per day (3-4 times each day)      Blood Glucose Range:        90 to 130 mg/dL as reported by the patient            PAST,FAMILY,   Past Medical History      Other Medical History      Joint pain at the wrist and hands             Past Surgical History      Other Past Surgical History      Right inguinal hernia repair            Past Family History      None            Social History      Marrital Status:        Lives independently:  Yes      Number of Children:  1      Occupation:  He is in middle school             Tobacco Use      Smoking status:  Light tobacco smoker (He smokes a rare cigar)            Vaping      Currently Vaping:  No            Alcohol Use      Rare            Substance Use      Substance Use:  Denies Use            General:  No Appetite Change, No Fatigue, No Weight Gain, No Weight Loss      Eyes:  Negative for Blurred Vision, Negative for Corrective Lenses, Negative for    Vision Changes      Cardiovascular:  No Chest Pain, No Palpitations      Gastrointestinal:  No Constipation, No Diarrhea, No Nausea/Vomiting      Integumentary:  No Lesions, No Rash, No Wounds      Neurologic:  No Extremity Pain, No Numbness, No Tingling      Psychiatric:  No Anxiety, No Depression      Endocrine:  Hypoglycemia (Rare episode), Nocturnal Hypo (History of but not     recently)      Other      THE ABOVE IS THE REVIEW OF SYSTEMS            General:  No Appetite Change, No Fatigue, No Weight Gain, No Weight Loss      Eyes:  Sclera Non-Icteric, EOM Intact, Eyelids without swelling      Cardiovascular:  No Peripheral Edema, Normal Chest Appearance      Musculoskeletal:  Steady Gait, Normal Strength, Normal ROM      Respiratory:  No Respiratory Distress, No Cyanosis, No use of Accessory Musc      Skin:  No Blisters, No Cuts\Scrapes, No Acanthosis Nigricans, No DM Dermopathy,     No Fungus, No NLD, No Rash, No Vitiligo      Psychiatric:  AAO X 3, Appropriate Affect, Intact Judgement      Other      THE ABOVE IS THE PHYSICAL EXAM            Impression      Type 1 diabetes uncontrolled without complication, joint pain            Diagnosis      Type 1 diabetes mellitus with hyperglycemia - E10.65             Notes            Plan      The patient acknowledges that he has not been eating her right knee due to a lot    of stress at home and irregular pattern with his new baby.  He is also not     exercising like he used to and he feels that if he were to get back to doing     these things he will get his glucose levels in better control.  Based on this we    elected to make no changes to his current medication management.  He is a     strongly encouraged to implement these new strategies to lower his A1c.  We will    collect an A1c and urine microalbumin.  He will be scheduled for a follow-up     appointment in 3 months.            Electronically signed by SUZANNE BECKER  05/16/2020 21:51       Disclaimer: Converted document may not contain table formatting or lab diagrams. Please see [employer] for the authenticated document.   no

## 2024-02-16 NOTE — DISCHARGE NOTE OB - PATIENT PORTAL LINK FT
You can access the FollowMyHealth Patient Portal offered by [facility] by registering at the following website: [email or web address]. By joining [employer]’s FollowMyHealth portal, you will also be able to view your health information using other applications (apps) compatible with our system.

## 2024-02-16 NOTE — OB PROVIDER H&P - HISTORY OF PRESENT ILLNESS
34 yo  @ 39+2 EFW 3200 presents for a scheduled repeat CS  denies ctx, lof, vb & endorses +FM  PNC complicated by polydraminos - last AFUA 25 per patient - denies GDMA2  patient received 5 Iron transfusions this pregnancy  accepts blood transfusion    POBHx: 2022 CS [facility] 6#11 - gHTN, MAB x 1   GynHx: denies  MedHx: denies  SHx: L knee surgery, tonsillectomy  Allergies: PCN - rash  Meds: PNV, ASA  Social: denies    VS  T(C): --  HR: --  BP: --  RR: --  SpO2: --    comfortable  abd soft gravid  EFM/[place] pending   sono pending

## 2024-02-16 NOTE — DISCHARGE NOTE OB - MEDICATION SUMMARY - MEDICATIONS TO STOP TAKING
I will STOP taking the medications listed below when I get home from the hospital:    aspirin 81 mg oral tablet  -- 1 tablet with sensor by mouth once a day

## 2024-02-16 NOTE — DISCHARGE NOTE OB - HOSPITAL COURSE
Scheduled rLTCS at 39w2d, uncomplicated   Viable female infant, apgars 8/9, weight 3090g, cephalic presentation  Hysterotomy closed in 1 layer using Vicryl. Additional figure of 8 stitches placed along lower edge of hysterotomy for hemostasis. Left uterine artery suture ligated.  Surgicel placed over hysterotomy, bladder serosa, and rectus muscle bed  1g IV TXA given for oozing noted in surgical bed  Grossly normal uterus, tubes, and ovaries  Abdomen closed in standard fashion  Pt and infant to recovery in stable condition  QBL: 135   IVF: 2000   UOP: 200

## 2024-02-16 NOTE — OB PROVIDER DELIVERY SUMMARY - NS_DELIVERYASSIST1_OBGYN_ALL_OB_FT
Subjective:   History was provided by the mother.    Clau Hernandez is a 18 y.o. male who is here for this well-child visit.    Current Issues:  Current concerns include none.  Currently menstruating? not applicable  Sexually active? no   Does patient snore? no     Review of Nutrition:  Current diet: regular  Balanced diet? yes    Social Screening:   Parental relations: good  Sibling relations: brothers: 2 and sisters: 1  Discipline concerns? no  Concerns regarding behavior with peers? yes - autism d/o  School performance: doing well; no concerns except  ADHD  Secondhand smoke exposure? no  Risk factors for sexually-transmitted infections: no  Risk factors for alcohol/drug use:  no    Review of Systems   Constitutional: Negative.  Negative for activity change, appetite change and fever.   HENT: Negative.  Negative for congestion and sore throat.    Eyes: Negative.  Negative for discharge and redness.   Respiratory: Negative.  Negative for cough and wheezing.    Cardiovascular: Negative.  Negative for chest pain and palpitations.   Gastrointestinal: Negative.  Negative for constipation, diarrhea and vomiting.   Genitourinary: Negative.  Negative for difficulty urinating and hematuria.   Musculoskeletal: Negative.    Skin: Negative.  Negative for rash and wound.   Allergic/Immunologic: Negative.    Neurological: Positive for headaches. Negative for syncope.   Hematological: Negative.    Psychiatric/Behavioral: Positive for behavioral problems and sleep disturbance.         Objective:     Physical Exam   Constitutional: He is oriented to person, place, and time. He appears well-developed and well-nourished.   HENT:   Head: Normocephalic and atraumatic.   Right Ear: External ear normal.   Left Ear: External ear normal.   Nose: Nose normal.   Mouth/Throat: Oropharynx is clear and moist.   Eyes: Pupils are equal, round, and reactive to light. Conjunctivae and EOM are normal.   Neck: Normal range of motion.  "  Cardiovascular: Normal rate, regular rhythm and normal heart sounds.   Pulmonary/Chest: Effort normal and breath sounds normal.   Abdominal: Soft. Bowel sounds are normal.   Musculoskeletal:   R foot in cast post-op   Neurological: He is alert and oriented to person, place, and time.   Skin: Skin is warm.   Psychiatric: He has a normal mood and affect. His behavior is normal.       Wt Readings from Last 3 Encounters:   03/04/20 62.2 kg (137 lb 2 oz) (45 %, Z= -0.13)*   02/26/20 62.2 kg (137 lb 2 oz) (45 %, Z= -0.13)*   02/24/20 61.7 kg (136 lb) (43 %, Z= -0.17)*     * Growth percentiles are based on CDC (Boys, 2-20 Years) data.     Ht Readings from Last 3 Encounters:   02/26/20 5' 7.5" (1.715 m) (32 %, Z= -0.46)*   02/24/20 5' 7.5" (1.715 m) (32 %, Z= -0.46)*   02/21/20 5' 7.5" (1.715 m) (32 %, Z= -0.46)*     * Growth percentiles are based on CDC (Boys, 2-20 Years) data.     There is no height or weight on file to calculate BMI.  45 %ile (Z= -0.13) based on CDC (Boys, 2-20 Years) weight-for-age data using vitals from 3/4/2020.  No height on file for this encounter.    Assessment and Plan     1. Anticipatory guidance discussed.  Gave handout on well-child issues at this age.    2.  Weight management:  The patient was counseled regarding nutrition, physical activity  3. Immunizations today: per orders.    Encounter for completion of form with patient  -     Nursing communication        No follow-ups on file.  " Jim Thomas MD

## 2024-02-16 NOTE — OB RN DELIVERY SUMMARY - NS_RESUSCITPROC_OBGYN_ALL_OB
From: Phillip Escamilla  To: Aranza Espinoza  Sent: 9/21/2021 1:49 PM CDT  Subject: Medical Paperwork [facility] Doctor Alexis Reyes trying to attach the [place] paperwork for work and also the medical verification for school accommodations. Just to let you know after our appointment I went totally downhill physically and then mentally! I got a migraine Friday morning and lasted till yesterday when I went to urgent care. This is second month in a row with my period. I definitely have the symptoms of PMDD and thought it was my period triggering my depressive episodes and other pain but now there's a name for it! Imelda been super brain foggy, crying, short tempered, and cant stand sounds or to much movement. I'm really hoping I can bounce back sooner then later with so much great things happening in my life I hate to see them fall apart. I had to miss work Friday yesterday and today, Im feeling tomorrow I will be at 85% myself and try to push myself forward, the kids will definitely do that for me or take me down! Lol.   Thank you for your continued genuine and humanistic approach to health care and support throughout the years.  
Paperwork completed.  Copy given to patient, copy sent to scanning, copy emailed to patient at [email or web address] and filed original in file cabinet.  Thank you, Taylor CASTRO    
Patient is returning call, please see message below.     Callback Number: [phone]  Best Availability: any  Can A Detailed Message Be Left? yes  Did you confirm the message with the caller?: yes    Thank you,  Milka Venegas    
Returned call to MsSarai Genesis at [phone], no answer, left message asking her to return call if assistance is needed.  Thank you, Taylor CASTRO    
Returned call, no answer, left message informing to call before 1130 today or after 0830 Monday.  Please LYNC RN if she returns call today before 1130, if not please send encounter as RN is not available after 12pm today.  Thank you, Taylor CASTRO    
Terry Hurtado HR lead at [employer] needing further clarification on [place] paperwork sent to her.  Please advise [phone]  
Tactile Stimulation/Pulse Oximetry

## 2024-02-16 NOTE — OB RN INTRAOPERATIVE NOTE - NSSELHIDDEN_OBGYN_ALL_OB_FT
[NS_DeliveryAttending1_OBGYN_ALL_OB_FT:[id number]],[NS_DeliveryRN_OBGYN_ALL_OB_FT:[email or web address]]

## 2024-02-16 NOTE — DISCHARGE NOTE OB - PROVIDER RX CONTACT NUMBER
Patient Instructions by Tony Calle MD at 03/30/17 02:53 PM     Author:  Tony Calle MD Service:  (none) Author Type:  Physician     Filed:  03/30/17 02:53 PM Encounter Date:  3/30/2017 Status:  Signed     :  Tony Calle MD (Physician)              PLAN:    Continue to control blood sugars.  No glasses needed for distance  May use +2.75 reading glasses.    Additional Educational Resources:  For additional resources regarding your symptoms, diagnosis, or further health information, please visit the Health Resources section on [email or web address] or the Online Health Resources section in [employer].        Revision History        User Key Date/Time User Provider Type Action    > [N/A] 03/30/17 02:53 PM Tony Calle MD Physician Sign             [phone] Negative

## 2024-02-16 NOTE — OB RN DELIVERY SUMMARY - NSSELHIDDEN_OBGYN_ALL_OB_FT
[NS_DeliveryAttending1_OBGYN_ALL_OB_FT:[email or web address]],[NS_DeliveryRN_OBGYN_ALL_OB_FT:[email or web address]]

## 2024-02-16 NOTE — DISCHARGE NOTE OB - CARE PROVIDERS DIRECT ADDRESSES
[email or web address]

## 2024-02-16 NOTE — OB PROVIDER DELIVERY SUMMARY - NSPROVIDERDELIVERYNOTE_OBGYN_ALL_OB_FT
Scheduled rLTCS at 39w2d, uncomplicated   Viable female infant, apgars 8/9, weight 3090g, cephalic presentation  Hysterotomy closed in 1 layer using Vicryl. Additional figure of 8 stitches placed along lower edge of hysterotomy for hemostasis. Left uterine artery suture ligated.  Surgicel placed over hysterotomy, bladder serosa, and rectus muscle bed  1g IV TXA given for oozing noted in surgical bed  Grossly normal uterus, tubes, and ovaries  Abdomen closed in standard fashion  Pt and infant to recovery in stable condition  QBL: 135   IVF: 2000   UOP: 200    Dictation #[id number]    Delivery with Dr. Alex Thomas, PGY-2

## 2024-02-17 LAB
BASOPHILS # BLD AUTO: 0.04 K/UL — SIGNIFICANT CHANGE UP (ref 0–0.2)
BASOPHILS NFR BLD AUTO: 0.4 % — SIGNIFICANT CHANGE UP (ref 0–2)
EOSINOPHIL # BLD AUTO: 0.01 K/UL — SIGNIFICANT CHANGE UP (ref 0–0.5)
EOSINOPHIL NFR BLD AUTO: 0.1 % — SIGNIFICANT CHANGE UP (ref 0–6)
HCT VFR BLD CALC: 39 % — SIGNIFICANT CHANGE UP (ref 34.5–45)
HGB BLD-MCNC: 13 G/DL — SIGNIFICANT CHANGE UP (ref 11.5–15.5)
IANC: 8.1 K/UL — HIGH (ref 1.8–7.4)
IMM GRANULOCYTES NFR BLD AUTO: 0.5 % — SIGNIFICANT CHANGE UP (ref 0–0.9)
LYMPHOCYTES # BLD AUTO: 1.57 K/UL — SIGNIFICANT CHANGE UP (ref 1–3.3)
LYMPHOCYTES # BLD AUTO: 15 % — SIGNIFICANT CHANGE UP (ref 13–44)
MCHC RBC-ENTMCNC: 28.7 PG — SIGNIFICANT CHANGE UP (ref 27–34)
MCHC RBC-ENTMCNC: 33.3 GM/DL — SIGNIFICANT CHANGE UP (ref 32–36)
MCV RBC AUTO: 86.1 FL — SIGNIFICANT CHANGE UP (ref 80–100)
MONOCYTES # BLD AUTO: 0.69 K/UL — SIGNIFICANT CHANGE UP (ref 0–0.9)
MONOCYTES NFR BLD AUTO: 6.6 % — SIGNIFICANT CHANGE UP (ref 2–14)
NEUTROPHILS # BLD AUTO: 8.1 K/UL — HIGH (ref 1.8–7.4)
NEUTROPHILS NFR BLD AUTO: 77.4 % — HIGH (ref 43–77)
NRBC # BLD: 0 /100 WBCS — SIGNIFICANT CHANGE UP (ref 0–0)
NRBC # FLD: 0 K/UL — SIGNIFICANT CHANGE UP (ref 0–0)
PLATELET # BLD AUTO: 143 K/UL — LOW (ref 150–400)
RBC # BLD: 4.53 M/UL — SIGNIFICANT CHANGE UP (ref 3.8–5.2)
RBC # FLD: 17 % — HIGH (ref 10.3–14.5)
WBC # BLD: 10.46 K/UL — SIGNIFICANT CHANGE UP (ref 3.8–10.5)
WBC # FLD AUTO: 10.46 K/UL — SIGNIFICANT CHANGE UP (ref 3.8–10.5)

## 2024-02-17 RX ORDER — IBUPROFEN 200 MG
600 TABLET ORAL EVERY 6 HOURS
Refills: 0 | Status: DISCONTINUED | OUTPATIENT
Start: 2024-02-17 | End: 2024-02-19

## 2024-02-17 RX ADMIN — Medication 975 MILLIGRAM(S): at 18:00

## 2024-02-17 RX ADMIN — SIMETHICONE 80 MILLIGRAM(S): 80 TABLET, CHEWABLE ORAL at 21:31

## 2024-02-17 RX ADMIN — SIMETHICONE 80 MILLIGRAM(S): 80 TABLET, CHEWABLE ORAL at 16:13

## 2024-02-17 RX ADMIN — Medication 975 MILLIGRAM(S): at 06:42

## 2024-02-17 RX ADMIN — Medication 30 MILLIGRAM(S): at 02:23

## 2024-02-17 RX ADMIN — Medication 975 MILLIGRAM(S): at 13:01

## 2024-02-17 RX ADMIN — Medication 975 MILLIGRAM(S): at 17:29

## 2024-02-17 RX ADMIN — HEPARIN SODIUM 5000 UNIT(S): 5000 INJECTION INTRAVENOUS; SUBCUTANEOUS at 23:57

## 2024-02-17 RX ADMIN — HEPARIN SODIUM 5000 UNIT(S): 5000 INJECTION INTRAVENOUS; SUBCUTANEOUS at 12:31

## 2024-02-17 RX ADMIN — Medication 30 MILLIGRAM(S): at 16:39

## 2024-02-17 RX ADMIN — Medication 30 MILLIGRAM(S): at 02:45

## 2024-02-17 RX ADMIN — Medication 975 MILLIGRAM(S): at 23:56

## 2024-02-17 RX ADMIN — Medication 975 MILLIGRAM(S): at 06:12

## 2024-02-17 RX ADMIN — Medication 30 MILLIGRAM(S): at 08:33

## 2024-02-17 RX ADMIN — Medication 975 MILLIGRAM(S): at 12:31

## 2024-02-17 RX ADMIN — Medication 30 MILLIGRAM(S): at 16:09

## 2024-02-17 RX ADMIN — Medication 975 MILLIGRAM(S): at 00:30

## 2024-02-17 RX ADMIN — Medication 30 MILLIGRAM(S): at 09:03

## 2024-02-17 NOTE — CHART NOTE - NSCHARTNOTEFT_GEN_A_CORE
Received call from nurse who noted that patient had vaginal swelling. Went to assess patient by bedside. On exam, R labia minora appears to have a swollen segment in middle section. Swollen area is not red or warm, not indurated, and not fluctuant. No pus or drainage noted. Nontender to touch, although patient reports that vulva is sensitive.     - Will continue ice packs to reduce swelling  - Swelling is not concerning for abscess or hematoma  - Delacruz can be removed, patient will be reassessed in morning    Santiago Bagley PGY1
PA Note    sono performed  VTX    ldnano mcgregor
Pt evaluated at bedside due to reports of incisional bleeding.     S: 32yo POD#1 s/p rLTCS. Pain well controlled, tolerating regular diet, not yet passing flatus, ambulating without difficulty, voiding spontaneously. Denies heavy vaginal bleeding, N/V, CP/SOB/lightheadedness/dizziness.     O:   Vital Signs Last 24 Hrs  T(C): 36.5 (17 Feb 2024 05:58), Max: 36.6 (17 Feb 2024 01:55)  T(F): 97.7 (17 Feb 2024 05:58), Max: 97.8 (17 Feb 2024 01:55)  HR: 71 (17 Feb 2024 05:58) (65 - 71)  BP: 118/88 (17 Feb 2024 05:58) (115/79 - 118/88)  BP(mean): --  RR: 17 (17 Feb 2024 05:58) (16 - 17)  SpO2: 100% (17 Feb 2024 05:58) (98% - 100%)    Parameters below as of 17 Feb 2024 05:58  Patient On (Oxygen Delivery Method): room air        Labs:  Blood type: A Positive  Rubella IgG: RPR: Negative                          13.0   10.46 >-----------< 143<L>    ( 02-17 @ 06:26 )             39.0                        12.4   13.98<H> >-----------< 143<L>    ( 02-16 @ 18:21 )             36.9                        13.4   7.06 >-----------< 159    ( 02-16 @ 11:16 )             39.3                  PE:  General: NAD  CV: RRR  Resp: CTAB  Abdomen: Mildly distended, appropriately tender, Fundus firm, incision moist with steri strips with dried old blood, intact, no active bleeding noted, replaced steri strips  : Nl lochia  Extremities: No erythema, no pitting edema    A/P: 32yo POD#1 s/p pLTCS.  Patient is stable and doing well post-operatively. Incision intact.    #Incision care  - Incision intact, replaced steri strips  - Discussed incision care and recommended to keep area dry    #Flatus  - Pt not yet passing flatus  - Recommended Simethicone, warm beverages, chewing gum, and ambulation    #PP Care  - Continue regular diet  - Increase ambulation  - Continue motrin, tylenol, oxycodone PRN for pain control.      Nadya Mccormick MD PGY1

## 2024-02-17 NOTE — PROVIDER CONTACT NOTE (OTHER) - ASSESSMENT
generalized Labial swelling with protruding area of swelling on bilateral labia minora. Sensitive to palpation. No bleeding/ discharge. Delacruz intact and urine output adequate.

## 2024-02-17 NOTE — PROGRESS NOTE ADULT - ASSESSMENT
Assessment and Plan  POD #1 s/p C/S.  Doing well.  Encourage ambulation.  Incisional care and PO instructions reviewed.  Reassess right vulva tomorrow

## 2024-02-18 RX ORDER — IBUPROFEN 200 MG
600 TABLET ORAL EVERY 6 HOURS
Refills: 0 | Status: DISCONTINUED | OUTPATIENT
Start: 2024-02-18 | End: 2024-02-19

## 2024-02-18 RX ORDER — ACETAMINOPHEN 500 MG
975 TABLET ORAL EVERY 6 HOURS
Refills: 0 | Status: DISCONTINUED | OUTPATIENT
Start: 2024-02-18 | End: 2024-02-19

## 2024-02-18 RX ADMIN — Medication 975 MILLIGRAM(S): at 22:01

## 2024-02-18 RX ADMIN — Medication 600 MILLIGRAM(S): at 05:38

## 2024-02-18 RX ADMIN — Medication 600 MILLIGRAM(S): at 06:08

## 2024-02-18 RX ADMIN — Medication 975 MILLIGRAM(S): at 23:00

## 2024-02-18 RX ADMIN — Medication 975 MILLIGRAM(S): at 00:26

## 2024-02-18 RX ADMIN — Medication 975 MILLIGRAM(S): at 09:40

## 2024-02-18 RX ADMIN — Medication 975 MILLIGRAM(S): at 14:25

## 2024-02-18 RX ADMIN — Medication 975 MILLIGRAM(S): at 15:15

## 2024-02-18 RX ADMIN — SIMETHICONE 80 MILLIGRAM(S): 80 TABLET, CHEWABLE ORAL at 22:11

## 2024-02-18 RX ADMIN — Medication 600 MILLIGRAM(S): at 17:45

## 2024-02-18 RX ADMIN — SIMETHICONE 80 MILLIGRAM(S): 80 TABLET, CHEWABLE ORAL at 17:46

## 2024-02-18 RX ADMIN — Medication 600 MILLIGRAM(S): at 18:31

## 2024-02-18 RX ADMIN — SIMETHICONE 80 MILLIGRAM(S): 80 TABLET, CHEWABLE ORAL at 07:58

## 2024-02-18 RX ADMIN — Medication 600 MILLIGRAM(S): at 12:59

## 2024-02-18 RX ADMIN — Medication 975 MILLIGRAM(S): at 08:44

## 2024-02-18 RX ADMIN — Medication 600 MILLIGRAM(S): at 11:59

## 2024-02-19 VITALS
RESPIRATION RATE: 18 BRPM | DIASTOLIC BLOOD PRESSURE: 85 MMHG | SYSTOLIC BLOOD PRESSURE: 119 MMHG | TEMPERATURE: 98 F | HEART RATE: 75 BPM | OXYGEN SATURATION: 100 %

## 2024-02-19 RX ADMIN — Medication 600 MILLIGRAM(S): at 08:09

## 2024-02-19 RX ADMIN — Medication 600 MILLIGRAM(S): at 01:50

## 2024-02-19 RX ADMIN — Medication 975 MILLIGRAM(S): at 06:20

## 2024-02-19 RX ADMIN — Medication 600 MILLIGRAM(S): at 03:41

## 2024-02-19 RX ADMIN — Medication 600 MILLIGRAM(S): at 08:45

## 2024-02-19 RX ADMIN — Medication 975 MILLIGRAM(S): at 05:00

## 2024-02-19 NOTE — PROGRESS NOTE ADULT - SUBJECTIVE AND OBJECTIVE BOX
Postop Day  __1_ s/p   C- Section    THERAPY:  [  ] Spinal morphine   [ x ] Epidural morphine   [  ] IV PCA Hydromorphone 1 mg/ml      Sedation Score:	  [ x ] Alert	    [  ] Drowsy        [  ] Arousable	[  ] Asleep	[  ] Unresponsive    Side Effects:	  [ x ] None	     [  ] Nausea        [  ] Pruritus        [  ] Weakness   [  ] Numbness        ASSESSMENT/ PLAN   [   ] Discontinue         [  ] Continue  [ x ]Documentation and Verification of current medications     Comments:       Patient is doing well.  OOBAA. Tolerating clears.  Pain is tolerable.  No residual anesthetic issues or complications noted.  
Post-Operative Note, C/S  She is a  33y woman who is now post-operative day: 1    Subjective:  The patient feels well.  She is ambulating.   She is tolerating regular diet.  She denies nausea and vomiting; denies fever.  She is voiding.  Her pain is controlled; incisional pain is appropriate.  She reports normal postpartum bleeding.       Physical exam:    Vital Signs Last 24 Hrs  T(C): 36.5 (17 Feb 2024 05:58), Max: 36.6 (16 Feb 2024 11:47)  T(F): 97.7 (17 Feb 2024 05:58), Max: 97.8 (16 Feb 2024 11:47)  HR: 71 (17 Feb 2024 05:58) (57 - 81)  BP: 118/88 (17 Feb 2024 05:58) (111/63 - 128/78)  BP(mean): 86 (16 Feb 2024 21:30) (74 - 97)  RR: 17 (17 Feb 2024 05:58) (11 - 21)  SpO2: 100% (17 Feb 2024 05:58) (95% - 100%)    Parameters below as of 17 Feb 2024 05:58  Patient On (Oxygen Delivery Method): room air        Gen: NAD  Breast: Soft, nontender, not engorged.  Abdomen: Soft, nontender, no distension , firm uterine fundus at umbilicus.  Incision: C/D/I.  Pelvic: Normal lochia noted  Ext: No calf tenderness    LABS:                        13.0   10.46 )-----------( 143      ( 17 Feb 2024 06:26 )             39.0     ABO Interpretation: A (02-16 @ 11:27)  Rh Interpretation: Positive (02-16 @ 11:27)  Antibody Screen: Negative (02-16 @ 11:27)    Rubella status:     Allergies    penicillin (Other)    Intolerances      MEDICATIONS  (STANDING):  acetaminophen     Tablet .. 975 milliGRAM(s) Oral <User Schedule>  diphtheria/tetanus/pertussis (acellular) Vaccine (Adacel) 0.5 milliLiter(s) IntraMuscular once  heparin   Injectable 5000 Unit(s) SubCutaneous every 12 hours  ibuprofen  Tablet. 600 milliGRAM(s) Oral every 6 hours  ketorolac   Injectable 30 milliGRAM(s) IV Push every 6 hours  lactated ringers. 1000 milliLiter(s) (75 mL/Hr) IV Continuous <Continuous>  lactated ringers. 1000 milliLiter(s) (125 mL/Hr) IV Continuous <Continuous>  oxytocin Infusion 333.333 milliUNIT(s)/Min (1000 mL/Hr) IV Continuous <Continuous>    MEDICATIONS  (PRN):  diphenhydrAMINE 25 milliGRAM(s) Oral every 6 hours PRN Pruritus  lanolin Ointment 1 Application(s) Topical every 6 hours PRN Sore Nipples  magnesium hydroxide Suspension 30 milliLiter(s) Oral two times a day PRN Constipation  oxyCODONE    IR 5 milliGRAM(s) Oral every 3 hours PRN Moderate to Severe Pain (4-10)  oxyCODONE    IR 5 milliGRAM(s) Oral once PRN Moderate to Severe Pain (4-10)  simethicone 80 milliGRAM(s) Chew every 4 hours PRN Gas                
Post-Operative Note, C/S  She is a  33y woman who is now post-operative day: 2    Subjective:  The patient feels well.  She is ambulating.   She is tolerating regular diet.  She denies nausea and vomiting; denies fever.  She is voiding.  Her pain is controlled; incisional pain is appropriate.  She reports normal postpartum bleeding.  She is breastfeeding.  She is formula feeding.    Physical exam:    Vital Signs Last 24 Hrs  T(C): 36.6 (18 Feb 2024 06:50), Max: 36.6 (18 Feb 2024 06:50)  T(F): 97.9 (18 Feb 2024 06:50), Max: 97.9 (18 Feb 2024 06:50)  HR: 88 (18 Feb 2024 06:50) (72 - 88)  BP: 127/73 (18 Feb 2024 06:50) (122/77 - 127/73)  BP(mean): --  RR: 20 (18 Feb 2024 06:50) (20 - 20)  SpO2: 100% (18 Feb 2024 06:50) (99% - 100%)    Parameters below as of 17 Feb 2024 23:00  Patient On (Oxygen Delivery Method): room air        Gen: NAD  Breast: Soft, nontender, not engorged.  Abdomen: Soft, nontender, distended +tympanic, firm uterine fundus at umbilicus.  Incision: C/D/I.  Pelvic: Normal lochia noted  Ext: No calf tenderness    LABS:                        13.0   10.46 )-----------( 143      ( 17 Feb 2024 06:26 )             39.0       Rubella status:     Allergies    penicillin (Other)    Intolerances      MEDICATIONS  (STANDING):  acetaminophen   Oral Liquid .. 975 milliGRAM(s) Oral every 6 hours  diphtheria/tetanus/pertussis (acellular) Vaccine (Adacel) 0.5 milliLiter(s) IntraMuscular once  heparin   Injectable 5000 Unit(s) SubCutaneous every 12 hours  ibuprofen  Suspension. 600 milliGRAM(s) Oral every 6 hours  ibuprofen  Tablet. 600 milliGRAM(s) Oral every 6 hours    MEDICATIONS  (PRN):  diphenhydrAMINE 25 milliGRAM(s) Oral every 6 hours PRN Pruritus  lanolin Ointment 1 Application(s) Topical every 6 hours PRN Sore Nipples  magnesium hydroxide Suspension 30 milliLiter(s) Oral two times a day PRN Constipation  oxyCODONE    IR 5 milliGRAM(s) Oral once PRN Moderate to Severe Pain (4-10)  oxyCODONE    IR 5 milliGRAM(s) Oral every 3 hours PRN Moderate to Severe Pain (4-10)  simethicone 80 milliGRAM(s) Chew every 4 hours PRN Gas        Assessment and Plan  POD # 2 s/p R'C/S. Right labia swelling - resolved.  Doing well.  Encourage ambulation.  Moderate pain secondary to gas pain. Patient recommended to take Simethicone around the clock and ambulate as much as possible  Incisional care and PO instructions reviewed.  CPC.  Discharge tomorrow      
Post-Operative Note, C/S  She is a  33y woman who is now post-operative day: 3    Subjective:  The patient feels well.  She is ambulating.   She is tolerating regular diet.  She denies nausea and vomiting; denies fever.  She is voiding.  Her pain is controlled; incisional pain is appropriate.  She reports normal postpartum bleeding.  She is breastfeeding.  She is formula feeding.    Physical exam:    Vital Signs Last 24 Hrs  T(C): 36.4 (19 Feb 2024 06:02), Max: 36.6 (18 Feb 2024 14:15)  T(F): 97.5 (19 Feb 2024 06:02), Max: 97.9 (18 Feb 2024 14:15)  HR: 72 (19 Feb 2024 06:02) (64 - 72)  BP: 123/78 (19 Feb 2024 06:02) (123/78 - 137/80)  BP(mean): --  RR: 18 (19 Feb 2024 06:02) (18 - 19)  SpO2: 100% (19 Feb 2024 06:02) (100% - 100%)    Parameters below as of 18 Feb 2024 14:15  Patient On (Oxygen Delivery Method): room air        Gen: NAD  Breast: Soft, nontender, not engorged.  Abdomen: Soft, nontender, no distension , firm uterine fundus at umbilicus.  Incision: C/D/I.  Pelvic: Normal lochia noted  Ext: No calf tenderness    LABS:      Rubella status:     Allergies    penicillin (Other)    Intolerances      MEDICATIONS  (STANDING):  acetaminophen   Oral Liquid .. 975 milliGRAM(s) Oral every 6 hours  diphtheria/tetanus/pertussis (acellular) Vaccine (Adacel) 0.5 milliLiter(s) IntraMuscular once  heparin   Injectable 5000 Unit(s) SubCutaneous every 12 hours  ibuprofen  Suspension. 600 milliGRAM(s) Oral every 6 hours  ibuprofen  Tablet. 600 milliGRAM(s) Oral every 6 hours    MEDICATIONS  (PRN):  diphenhydrAMINE 25 milliGRAM(s) Oral every 6 hours PRN Pruritus  lanolin Ointment 1 Application(s) Topical every 6 hours PRN Sore Nipples  magnesium hydroxide Suspension 30 milliLiter(s) Oral two times a day PRN Constipation  oxyCODONE    IR 5 milliGRAM(s) Oral once PRN Moderate to Severe Pain (4-10)  oxyCODONE    IR 5 milliGRAM(s) Oral every 3 hours PRN Moderate to Severe Pain (4-10)  simethicone 80 milliGRAM(s) Chew every 4 hours PRN Gas        Assessment and Plan  POD # 3 s/p C/S.  Doing well.  Encourage ambulation.  Incisional care and PO instructions reviewed.  CPC.  Discharge home today  Follow up in the office in 2 weeks or PRN

## 2024-09-29 NOTE — OB RN PATIENT PROFILE - DATE OF FIRST COVID-19 BOOSTER
[0] : 2) Feeling down, depressed, or hopeless: Not at all (0) [PHQ-2 Negative - No further assessment needed] : PHQ-2 Negative - No further assessment needed [Never] : Never [[id number]] : 0 27-Oct-2021

## 2024-12-12 NOTE — OB RN PATIENT PROFILE - FUNCTIONAL SCREEN CURRENT LEVEL: SWALLOWING (IF SCORE 2 OR MORE FOR ANY ITEM, CONSULT REHAB SERVICES), MLM)
Stable, continue current treatment watch for side effects follow-up with cardiologist    Orders:    dilTIAZem (CARDIZEM CD) 240 MG extended release capsule; Take 1 capsule by mouth daily    atorvastatin (LIPITOR) 40 MG tablet; Take 1 tablet by mouth daily     0 = swallows foods/liquids without difficulty

## 2025-01-31 RX ORDER — OSELTAMIVIR PHOSPHATE 75 MG/1
75 CAPSULE ORAL TWICE DAILY
Qty: 1 | Refills: 0 | Status: ACTIVE | COMMUNITY
Start: 2025-01-31 | End: 1900-01-01

## 2025-02-24 RX ORDER — ONDANSETRON 8 MG/1
8 TABLET ORAL EVERY 8 HOURS
Qty: 15 | Refills: 0 | Status: ACTIVE | COMMUNITY
Start: 2025-02-24 | End: 1900-01-01

## 2025-03-11 NOTE — OB PROVIDER DELIVERY SUMMARY - NSSELHIDDEN_OBGYN_ALL_OB_FT
Detail Level: Zone [NS_DeliveryAttending1_OBGYN_ALL_OB_FT:[id number]] [NS_DeliveryAttending1_OBGYN_ALL_OB_FT:[id number]=],[NS_DeliveryAssist1_OBGYN_ALL_OB_FT:[id number]=]